# Patient Record
Sex: FEMALE | Race: WHITE | NOT HISPANIC OR LATINO | Employment: STUDENT | URBAN - METROPOLITAN AREA
[De-identification: names, ages, dates, MRNs, and addresses within clinical notes are randomized per-mention and may not be internally consistent; named-entity substitution may affect disease eponyms.]

---

## 2017-03-06 ENCOUNTER — APPOINTMENT (EMERGENCY)
Dept: CT IMAGING | Facility: HOSPITAL | Age: 22
End: 2017-03-06
Payer: COMMERCIAL

## 2017-03-06 ENCOUNTER — HOSPITAL ENCOUNTER (EMERGENCY)
Facility: HOSPITAL | Age: 22
Discharge: HOME | End: 2017-03-06
Attending: EMERGENCY MEDICINE | Admitting: EMERGENCY MEDICINE
Payer: COMMERCIAL

## 2017-03-06 VITALS
HEART RATE: 71 BPM | RESPIRATION RATE: 18 BRPM | DIASTOLIC BLOOD PRESSURE: 63 MMHG | OXYGEN SATURATION: 100 % | SYSTOLIC BLOOD PRESSURE: 98 MMHG | TEMPERATURE: 97.1 F

## 2017-03-06 DIAGNOSIS — N23 RENAL COLIC ON RIGHT SIDE: Primary | ICD-10-CM

## 2017-03-06 DIAGNOSIS — N39.0 UTI (URINARY TRACT INFECTION): ICD-10-CM

## 2017-03-06 LAB
ALBUMIN SERPL BCP-MCNC: 4.3 G/DL (ref 3.5–5)
ALP SERPL-CCNC: 60 U/L (ref 46–116)
ALT SERPL W P-5'-P-CCNC: 19 U/L (ref 12–78)
ANION GAP SERPL CALCULATED.3IONS-SCNC: 8 MMOL/L (ref 4–13)
AST SERPL W P-5'-P-CCNC: 12 U/L (ref 5–45)
BACTERIA UR QL AUTO: ABNORMAL /HPF
BASOPHILS # BLD AUTO: 0.03 THOUSANDS/ΜL (ref 0–0.1)
BASOPHILS NFR BLD AUTO: 0 % (ref 0–1)
BILIRUB SERPL-MCNC: 0.2 MG/DL (ref 0.2–1)
BILIRUB UR QL STRIP: NEGATIVE
BUN SERPL-MCNC: 11 MG/DL (ref 5–25)
CALCIUM SERPL-MCNC: 8.7 MG/DL (ref 8.3–10.1)
CHLORIDE SERPL-SCNC: 106 MMOL/L (ref 100–108)
CK SERPL-CCNC: 80 U/L (ref 26–192)
CLARITY UR: CLEAR
CO2 SERPL-SCNC: 26 MMOL/L (ref 21–32)
COLOR UR: YELLOW
CREAT SERPL-MCNC: 0.68 MG/DL (ref 0.6–1.3)
EOSINOPHIL # BLD AUTO: 0.37 THOUSAND/ΜL (ref 0–0.61)
EOSINOPHIL NFR BLD AUTO: 3 % (ref 0–6)
ERYTHROCYTE [DISTWIDTH] IN BLOOD BY AUTOMATED COUNT: 12.7 % (ref 11.6–15.1)
GFR SERPL CREATININE-BSD FRML MDRD: >60 ML/MIN/1.73SQ M
GLUCOSE SERPL-MCNC: 78 MG/DL (ref 65–140)
GLUCOSE UR STRIP-MCNC: NEGATIVE MG/DL
HCG UR QL: NEGATIVE
HCT VFR BLD AUTO: 37 % (ref 34.8–46.1)
HGB BLD-MCNC: 12.3 G/DL (ref 11.5–15.4)
HGB UR QL STRIP.AUTO: ABNORMAL
KETONES UR STRIP-MCNC: NEGATIVE MG/DL
LEUKOCYTE ESTERASE UR QL STRIP: NEGATIVE
LIPASE SERPL-CCNC: 240 U/L (ref 73–393)
LYMPHOCYTES # BLD AUTO: 4.05 THOUSANDS/ΜL (ref 0.6–4.47)
LYMPHOCYTES NFR BLD AUTO: 37 % (ref 14–44)
MCH RBC QN AUTO: 31.4 PG (ref 26.8–34.3)
MCHC RBC AUTO-ENTMCNC: 33.2 G/DL (ref 31.4–37.4)
MCV RBC AUTO: 94 FL (ref 82–98)
MONOCYTES # BLD AUTO: 0.97 THOUSAND/ΜL (ref 0.17–1.22)
MONOCYTES NFR BLD AUTO: 9 % (ref 4–12)
NEUTROPHILS # BLD AUTO: 5.68 THOUSANDS/ΜL (ref 1.85–7.62)
NEUTS SEG NFR BLD AUTO: 51 % (ref 43–75)
NITRITE UR QL STRIP: NEGATIVE
NON-SQ EPI CELLS URNS QL MICRO: ABNORMAL /HPF
PH UR STRIP.AUTO: 5.5 [PH] (ref 4.5–8)
PLATELET # BLD AUTO: 290 THOUSANDS/UL (ref 149–390)
PMV BLD AUTO: 8.8 FL (ref 8.9–12.7)
POTASSIUM SERPL-SCNC: 3.7 MMOL/L (ref 3.5–5.3)
PROT SERPL-MCNC: 7.1 G/DL (ref 6.4–8.2)
PROT UR STRIP-MCNC: NEGATIVE MG/DL
RBC # BLD AUTO: 3.92 MILLION/UL (ref 3.81–5.12)
RBC #/AREA URNS AUTO: ABNORMAL /HPF
SODIUM SERPL-SCNC: 140 MMOL/L (ref 136–145)
SP GR UR STRIP.AUTO: <=1.005 (ref 1–1.03)
UROBILINOGEN UR QL STRIP.AUTO: 0.2 E.U./DL
WBC # BLD AUTO: 11.1 THOUSAND/UL (ref 4.31–10.16)
WBC #/AREA URNS AUTO: ABNORMAL /HPF

## 2017-03-06 PROCEDURE — 87086 URINE CULTURE/COLONY COUNT: CPT | Performed by: EMERGENCY MEDICINE

## 2017-03-06 PROCEDURE — 36415 COLL VENOUS BLD VENIPUNCTURE: CPT | Performed by: EMERGENCY MEDICINE

## 2017-03-06 PROCEDURE — 83690 ASSAY OF LIPASE: CPT | Performed by: EMERGENCY MEDICINE

## 2017-03-06 PROCEDURE — 99284 EMERGENCY DEPT VISIT MOD MDM: CPT

## 2017-03-06 PROCEDURE — 81025 URINE PREGNANCY TEST: CPT | Performed by: EMERGENCY MEDICINE

## 2017-03-06 PROCEDURE — 96374 THER/PROPH/DIAG INJ IV PUSH: CPT

## 2017-03-06 PROCEDURE — 80053 COMPREHEN METABOLIC PANEL: CPT | Performed by: EMERGENCY MEDICINE

## 2017-03-06 PROCEDURE — 81001 URINALYSIS AUTO W/SCOPE: CPT | Performed by: EMERGENCY MEDICINE

## 2017-03-06 PROCEDURE — 96375 TX/PRO/DX INJ NEW DRUG ADDON: CPT

## 2017-03-06 PROCEDURE — 74176 CT ABD & PELVIS W/O CONTRAST: CPT

## 2017-03-06 PROCEDURE — 85025 COMPLETE CBC W/AUTO DIFF WBC: CPT | Performed by: EMERGENCY MEDICINE

## 2017-03-06 PROCEDURE — 96361 HYDRATE IV INFUSION ADD-ON: CPT

## 2017-03-06 PROCEDURE — 82550 ASSAY OF CK (CPK): CPT | Performed by: EMERGENCY MEDICINE

## 2017-03-06 RX ORDER — ONDANSETRON 2 MG/ML
4 INJECTION INTRAMUSCULAR; INTRAVENOUS ONCE
Status: COMPLETED | OUTPATIENT
Start: 2017-03-06 | End: 2017-03-06

## 2017-03-06 RX ORDER — KETOROLAC TROMETHAMINE 30 MG/ML
30 INJECTION, SOLUTION INTRAMUSCULAR; INTRAVENOUS ONCE
Status: COMPLETED | OUTPATIENT
Start: 2017-03-06 | End: 2017-03-06

## 2017-03-06 RX ORDER — SULFAMETHOXAZOLE AND TRIMETHOPRIM 800; 160 MG/1; MG/1
1 TABLET ORAL ONCE
Status: COMPLETED | OUTPATIENT
Start: 2017-03-06 | End: 2017-03-06

## 2017-03-06 RX ORDER — METOCLOPRAMIDE 10 MG/1
10 TABLET ORAL 4 TIMES DAILY
Qty: 28 TABLET | Refills: 0 | Status: SHIPPED | OUTPATIENT
Start: 2017-03-06 | End: 2017-03-13

## 2017-03-06 RX ORDER — SULFAMETHOXAZOLE AND TRIMETHOPRIM 800; 160 MG/1; MG/1
1 TABLET ORAL 2 TIMES DAILY
Qty: 14 TABLET | Refills: 0 | Status: SHIPPED | OUTPATIENT
Start: 2017-03-06 | End: 2017-03-13

## 2017-03-06 RX ORDER — TAMSULOSIN HYDROCHLORIDE 0.4 MG/1
0.4 CAPSULE ORAL ONCE
Status: COMPLETED | OUTPATIENT
Start: 2017-03-06 | End: 2017-03-06

## 2017-03-06 RX ORDER — OXYCODONE HYDROCHLORIDE AND ACETAMINOPHEN 5; 325 MG/1; MG/1
1 TABLET ORAL EVERY 4 HOURS PRN
Qty: 20 TABLET | Refills: 0 | Status: SHIPPED | OUTPATIENT
Start: 2017-03-06 | End: 2017-03-16

## 2017-03-06 RX ADMIN — HYDROMORPHONE HYDROCHLORIDE 0.5 MG: 1 INJECTION, SOLUTION INTRAMUSCULAR; INTRAVENOUS; SUBCUTANEOUS at 02:39

## 2017-03-06 RX ADMIN — KETOROLAC TROMETHAMINE 30 MG: 30 INJECTION, SOLUTION INTRAMUSCULAR at 03:11

## 2017-03-06 RX ADMIN — ONDANSETRON 4 MG: 2 INJECTION INTRAMUSCULAR; INTRAVENOUS at 02:18

## 2017-03-06 RX ADMIN — TAMSULOSIN HYDROCHLORIDE 0.4 MG: 0.4 CAPSULE ORAL at 03:55

## 2017-03-06 RX ADMIN — SODIUM CHLORIDE 1000 ML: 0.9 INJECTION, SOLUTION INTRAVENOUS at 02:14

## 2017-03-06 RX ADMIN — HYDROMORPHONE HYDROCHLORIDE 0.5 MG: 1 INJECTION, SOLUTION INTRAMUSCULAR; INTRAVENOUS; SUBCUTANEOUS at 02:19

## 2017-03-06 RX ADMIN — SULFAMETHOXAZOLE AND TRIMETHOPRIM 1 TABLET: 800; 160 TABLET ORAL at 03:56

## 2017-03-07 LAB — BACTERIA UR CULT: NORMAL

## 2017-09-27 ENCOUNTER — HOSPITAL ENCOUNTER (OUTPATIENT)
Dept: RADIOLOGY | Facility: HOSPITAL | Age: 22
Discharge: HOME | End: 2017-09-27
Attending: UROLOGY
Payer: COMMERCIAL

## 2017-09-27 ENCOUNTER — TRANSCRIBE ORDERS (OUTPATIENT)
Dept: ADMINISTRATIVE | Facility: HOSPITAL | Age: 22
End: 2017-09-27

## 2017-09-27 ENCOUNTER — HOSPITAL ENCOUNTER (OUTPATIENT)
Dept: ULTRASOUND IMAGING | Facility: HOSPITAL | Age: 22
Discharge: HOME | End: 2017-09-27
Payer: COMMERCIAL

## 2017-09-27 DIAGNOSIS — N20.0 CALCULUS OF KIDNEY: ICD-10-CM

## 2017-09-27 DIAGNOSIS — N20.0 CALCULUS OF KIDNEY: Primary | ICD-10-CM

## 2017-09-27 PROCEDURE — 76770 US EXAM ABDO BACK WALL COMP: CPT

## 2017-09-27 PROCEDURE — 74000 HB X-RAY EXAM OF ABDOMEN (SINGLE ANTEROPOSTERIOR VIEW): CPT

## 2018-01-13 NOTE — MISCELLANEOUS
Message  Called patient to review ultrasound results  I reached her voicemail, and discussed that her 3mm right ureteral calculus had passed  In addition her hydroureteronephrosis has resolved  4mm right lower pole stone remains stable  Debris in bladder likely represents passed stone fragments, as her UA last week was negative for infection  Plan is to continue potassium citrate  She has been scheduled for an office visit later this week as well to review short and long term management of her stone disease        Signatures   Electronically signed by : Raoul Esteban MD; Nov 21 2016  4:09PM EST                       (Author)

## 2018-01-17 NOTE — MISCELLANEOUS
Message  I have attempted to contact the patient multiple times over the past 72 hours in response to her left flank and abdominal pain  I have left her a few voice mail messages  I reviewed her most recent KUB which has revealed some intrarenal left calculi but no evidence of ureteral calculi  Her prior ultrasound was negative for hydronephrosis  I told her by phone that I'm not sure if her symptoms are related to her left intrarenal calculi  Nevertheless I would like to present her with a few options which include obtaining a CAT scan, or proceeding with intervention in the form of ESWL  When we spoke last she preferred to avoid radiation exposure from it additional CAT scan secondary to her young age and I think that this is certainly an appropriate concern  As her stones are radiolucent on KUB and her symptoms are on the left her stones could be targeted by ESWL if she remains symptomatic  She was instructed to call back to my office if she continues to have any symptoms  Again she may benefit from ESWL though it is unclear if her abdominal and left-sided pain is in fact related to the stones were not        Signatures   Electronically signed by : Pawan Fitzpatrick MD; Jul 1 2016  9:31AM EST                       (Author)

## 2018-01-17 NOTE — MISCELLANEOUS
Provider Comments  Provider Comments:   Patient was a no show for today's appointment    Anyi Zavala      Signatures   Electronically signed by : Ata Rivera MD; Dec  9 2016 11:49AM EST

## 2020-05-16 ENCOUNTER — APPOINTMENT (EMERGENCY)
Dept: ULTRASOUND IMAGING | Facility: HOSPITAL | Age: 25
End: 2020-05-16
Payer: COMMERCIAL

## 2020-05-16 ENCOUNTER — HOSPITAL ENCOUNTER (EMERGENCY)
Facility: HOSPITAL | Age: 25
Discharge: HOME | End: 2020-05-16
Attending: EMERGENCY MEDICINE | Admitting: EMERGENCY MEDICINE
Payer: COMMERCIAL

## 2020-05-16 VITALS
HEART RATE: 92 BPM | SYSTOLIC BLOOD PRESSURE: 97 MMHG | WEIGHT: 123.02 LBS | BODY MASS INDEX: 22.5 KG/M2 | DIASTOLIC BLOOD PRESSURE: 64 MMHG | TEMPERATURE: 98.8 F | OXYGEN SATURATION: 97 % | RESPIRATION RATE: 16 BRPM

## 2020-05-16 DIAGNOSIS — N10 ACUTE PYELONEPHRITIS: Primary | ICD-10-CM

## 2020-05-16 DIAGNOSIS — D72.829 LEUKOCYTOSIS: ICD-10-CM

## 2020-05-16 DIAGNOSIS — R11.2 NAUSEA AND VOMITING: ICD-10-CM

## 2020-05-16 LAB
ALBUMIN SERPL BCP-MCNC: 3.7 G/DL (ref 3.5–5)
ALP SERPL-CCNC: 58 U/L (ref 46–116)
ALT SERPL W P-5'-P-CCNC: 19 U/L (ref 12–78)
ANION GAP SERPL CALCULATED.3IONS-SCNC: 13 MMOL/L (ref 4–13)
AST SERPL W P-5'-P-CCNC: 15 U/L (ref 5–45)
BACTERIA UR QL AUTO: ABNORMAL /HPF
BASOPHILS # BLD MANUAL: 0 THOUSAND/UL (ref 0–0.1)
BASOPHILS NFR MAR MANUAL: 0 % (ref 0–1)
BILIRUB SERPL-MCNC: 0.39 MG/DL (ref 0.2–1)
BILIRUB UR QL STRIP: NEGATIVE
BUN SERPL-MCNC: 11 MG/DL (ref 5–25)
CALCIUM SERPL-MCNC: 8.8 MG/DL (ref 8.3–10.1)
CHLORIDE SERPL-SCNC: 99 MMOL/L (ref 100–108)
CLARITY UR: ABNORMAL
CO2 SERPL-SCNC: 22 MMOL/L (ref 21–32)
COLOR UR: YELLOW
CREAT SERPL-MCNC: 0.89 MG/DL (ref 0.6–1.3)
EOSINOPHIL # BLD MANUAL: 0 THOUSAND/UL (ref 0–0.4)
EOSINOPHIL NFR BLD MANUAL: 0 % (ref 0–6)
ERYTHROCYTE [DISTWIDTH] IN BLOOD BY AUTOMATED COUNT: 12.1 % (ref 11.6–15.1)
EXT PREG TEST URINE: NEGATIVE
EXT. CONTROL ED NAV: NORMAL
GFR SERPL CREATININE-BSD FRML MDRD: 91 ML/MIN/1.73SQ M
GLUCOSE SERPL-MCNC: 108 MG/DL (ref 65–140)
GLUCOSE UR STRIP-MCNC: NEGATIVE MG/DL
HCT VFR BLD AUTO: 39.6 % (ref 34.8–46.1)
HGB BLD-MCNC: 13.3 G/DL (ref 11.5–15.4)
HGB UR QL STRIP.AUTO: ABNORMAL
KETONES UR STRIP-MCNC: ABNORMAL MG/DL
LEUKOCYTE ESTERASE UR QL STRIP: ABNORMAL
LYMPHOCYTES # BLD AUTO: 0.52 THOUSAND/UL (ref 0.6–4.47)
LYMPHOCYTES # BLD AUTO: 3 % (ref 14–44)
MCH RBC QN AUTO: 32.4 PG (ref 26.8–34.3)
MCHC RBC AUTO-ENTMCNC: 33.6 G/DL (ref 31.4–37.4)
MCV RBC AUTO: 96 FL (ref 82–98)
MONOCYTES # BLD AUTO: 1.22 THOUSAND/UL (ref 0–1.22)
MONOCYTES NFR BLD: 7 % (ref 4–12)
MUCOUS THREADS UR QL AUTO: ABNORMAL
NEUTROPHILS # BLD MANUAL: 15.63 THOUSAND/UL (ref 1.85–7.62)
NEUTS BAND NFR BLD MANUAL: 3 % (ref 0–8)
NEUTS SEG NFR BLD AUTO: 87 % (ref 43–75)
NITRITE UR QL STRIP: POSITIVE
NON-SQ EPI CELLS URNS QL MICRO: ABNORMAL /HPF
NRBC BLD AUTO-RTO: 0 /100 WBCS
PH UR STRIP.AUTO: 6 [PH]
PLATELET # BLD AUTO: 195 THOUSANDS/UL (ref 149–390)
PLATELET BLD QL SMEAR: ADEQUATE
PMV BLD AUTO: 8.9 FL (ref 8.9–12.7)
POTASSIUM SERPL-SCNC: 3.6 MMOL/L (ref 3.5–5.3)
PROT SERPL-MCNC: 7.7 G/DL (ref 6.4–8.2)
PROT UR STRIP-MCNC: ABNORMAL MG/DL
RBC # BLD AUTO: 4.11 MILLION/UL (ref 3.81–5.12)
RBC #/AREA URNS AUTO: ABNORMAL /HPF
SODIUM SERPL-SCNC: 134 MMOL/L (ref 136–145)
SP GR UR STRIP.AUTO: >=1.03 (ref 1–1.03)
TOTAL CELLS COUNTED SPEC: 100
UROBILINOGEN UR QL STRIP.AUTO: 0.2 E.U./DL
WBC # BLD AUTO: 17.37 THOUSAND/UL (ref 4.31–10.16)
WBC #/AREA URNS AUTO: ABNORMAL /HPF

## 2020-05-16 PROCEDURE — 76770 US EXAM ABDO BACK WALL COMP: CPT

## 2020-05-16 PROCEDURE — 81001 URINALYSIS AUTO W/SCOPE: CPT | Performed by: PHYSICIAN ASSISTANT

## 2020-05-16 PROCEDURE — 81025 URINE PREGNANCY TEST: CPT | Performed by: PHYSICIAN ASSISTANT

## 2020-05-16 PROCEDURE — 87086 URINE CULTURE/COLONY COUNT: CPT | Performed by: PHYSICIAN ASSISTANT

## 2020-05-16 PROCEDURE — 87040 BLOOD CULTURE FOR BACTERIA: CPT | Performed by: PHYSICIAN ASSISTANT

## 2020-05-16 PROCEDURE — 85007 BL SMEAR W/DIFF WBC COUNT: CPT | Performed by: PHYSICIAN ASSISTANT

## 2020-05-16 PROCEDURE — 96375 TX/PRO/DX INJ NEW DRUG ADDON: CPT

## 2020-05-16 PROCEDURE — 87186 SC STD MICRODIL/AGAR DIL: CPT | Performed by: PHYSICIAN ASSISTANT

## 2020-05-16 PROCEDURE — 87077 CULTURE AEROBIC IDENTIFY: CPT | Performed by: PHYSICIAN ASSISTANT

## 2020-05-16 PROCEDURE — 96361 HYDRATE IV INFUSION ADD-ON: CPT

## 2020-05-16 PROCEDURE — 99284 EMERGENCY DEPT VISIT MOD MDM: CPT

## 2020-05-16 PROCEDURE — 96365 THER/PROPH/DIAG IV INF INIT: CPT

## 2020-05-16 PROCEDURE — 99284 EMERGENCY DEPT VISIT MOD MDM: CPT | Performed by: PHYSICIAN ASSISTANT

## 2020-05-16 PROCEDURE — 85027 COMPLETE CBC AUTOMATED: CPT | Performed by: PHYSICIAN ASSISTANT

## 2020-05-16 PROCEDURE — 36415 COLL VENOUS BLD VENIPUNCTURE: CPT | Performed by: PHYSICIAN ASSISTANT

## 2020-05-16 PROCEDURE — 80053 COMPREHEN METABOLIC PANEL: CPT | Performed by: PHYSICIAN ASSISTANT

## 2020-05-16 RX ORDER — ACETAMINOPHEN 325 MG/1
975 TABLET ORAL ONCE
Status: COMPLETED | OUTPATIENT
Start: 2020-05-16 | End: 2020-05-16

## 2020-05-16 RX ORDER — ONDANSETRON 4 MG/1
4 TABLET, ORALLY DISINTEGRATING ORAL EVERY 8 HOURS PRN
Qty: 20 TABLET | Refills: 0 | Status: SHIPPED | OUTPATIENT
Start: 2020-05-16

## 2020-05-16 RX ORDER — OXYCODONE HYDROCHLORIDE AND ACETAMINOPHEN 5; 325 MG/1; MG/1
1 TABLET ORAL ONCE
Status: COMPLETED | OUTPATIENT
Start: 2020-05-16 | End: 2020-05-16

## 2020-05-16 RX ORDER — ONDANSETRON 2 MG/ML
4 INJECTION INTRAMUSCULAR; INTRAVENOUS ONCE
Status: COMPLETED | OUTPATIENT
Start: 2020-05-16 | End: 2020-05-16

## 2020-05-16 RX ORDER — OXYCODONE HYDROCHLORIDE AND ACETAMINOPHEN 5; 325 MG/1; MG/1
1 TABLET ORAL EVERY 4 HOURS PRN
Qty: 10 TABLET | Refills: 0 | Status: SHIPPED | OUTPATIENT
Start: 2020-05-16 | End: 2020-05-19

## 2020-05-16 RX ORDER — KETOROLAC TROMETHAMINE 30 MG/ML
15 INJECTION, SOLUTION INTRAMUSCULAR; INTRAVENOUS ONCE
Status: DISCONTINUED | OUTPATIENT
Start: 2020-05-16 | End: 2020-05-16

## 2020-05-16 RX ORDER — HYDROMORPHONE HCL/PF 1 MG/ML
0.5 SYRINGE (ML) INJECTION ONCE
Status: COMPLETED | OUTPATIENT
Start: 2020-05-16 | End: 2020-05-16

## 2020-05-16 RX ORDER — CEPHALEXIN 500 MG/1
500 CAPSULE ORAL EVERY 6 HOURS SCHEDULED
Qty: 40 CAPSULE | Refills: 0 | Status: SHIPPED | OUTPATIENT
Start: 2020-05-16 | End: 2020-05-26

## 2020-05-16 RX ORDER — ONDANSETRON 4 MG/1
4 TABLET, ORALLY DISINTEGRATING ORAL EVERY 8 HOURS PRN
Qty: 20 TABLET | Refills: 0 | Status: SHIPPED | OUTPATIENT
Start: 2020-05-16 | End: 2020-05-16 | Stop reason: SDUPTHER

## 2020-05-16 RX ORDER — CEPHALEXIN 500 MG/1
500 CAPSULE ORAL EVERY 6 HOURS SCHEDULED
Qty: 40 CAPSULE | Refills: 0 | Status: SHIPPED | OUTPATIENT
Start: 2020-05-16 | End: 2020-05-16 | Stop reason: SDUPTHER

## 2020-05-16 RX ORDER — ONDANSETRON 4 MG/1
4 TABLET, ORALLY DISINTEGRATING ORAL ONCE
Status: COMPLETED | OUTPATIENT
Start: 2020-05-16 | End: 2020-05-16

## 2020-05-16 RX ADMIN — ONDANSETRON 4 MG: 2 INJECTION INTRAMUSCULAR; INTRAVENOUS at 18:34

## 2020-05-16 RX ADMIN — CEFTRIAXONE SODIUM 1000 MG: 10 INJECTION, POWDER, FOR SOLUTION INTRAVENOUS at 19:20

## 2020-05-16 RX ADMIN — HYDROMORPHONE HYDROCHLORIDE 0.5 MG: 1 INJECTION, SOLUTION INTRAMUSCULAR; INTRAVENOUS; SUBCUTANEOUS at 18:35

## 2020-05-16 RX ADMIN — ONDANSETRON 4 MG: 4 TABLET, ORALLY DISINTEGRATING ORAL at 21:06

## 2020-05-16 RX ADMIN — SODIUM CHLORIDE 1000 ML: 0.9 INJECTION, SOLUTION INTRAVENOUS at 18:32

## 2020-05-16 RX ADMIN — ACETAMINOPHEN 975 MG: 325 TABLET, FILM COATED ORAL at 18:23

## 2020-05-16 RX ADMIN — OXYCODONE HYDROCHLORIDE AND ACETAMINOPHEN 1 TABLET: 5; 325 TABLET ORAL at 21:05

## 2020-05-19 LAB — BACTERIA UR CULT: ABNORMAL

## 2020-05-22 LAB
BACTERIA BLD CULT: NORMAL
BACTERIA BLD CULT: NORMAL

## 2020-09-30 ENCOUNTER — HOSPITAL ENCOUNTER (EMERGENCY)
Facility: HOSPITAL | Age: 25
Discharge: HOME | End: 2020-09-30
Attending: EMERGENCY MEDICINE | Admitting: EMERGENCY MEDICINE
Payer: COMMERCIAL

## 2020-09-30 ENCOUNTER — APPOINTMENT (EMERGENCY)
Dept: CT IMAGING | Facility: HOSPITAL | Age: 25
End: 2020-09-30
Payer: COMMERCIAL

## 2020-09-30 VITALS
RESPIRATION RATE: 16 BRPM | TEMPERATURE: 97.6 F | DIASTOLIC BLOOD PRESSURE: 81 MMHG | OXYGEN SATURATION: 98 % | WEIGHT: 143.08 LBS | HEART RATE: 85 BPM | SYSTOLIC BLOOD PRESSURE: 124 MMHG | BODY MASS INDEX: 26.17 KG/M2

## 2020-09-30 DIAGNOSIS — R11.0 NAUSEA: ICD-10-CM

## 2020-09-30 DIAGNOSIS — N39.0 UTI (URINARY TRACT INFECTION): Primary | ICD-10-CM

## 2020-09-30 DIAGNOSIS — R10.9 LEFT FLANK PAIN: ICD-10-CM

## 2020-09-30 LAB
ALBUMIN SERPL BCP-MCNC: 4 G/DL (ref 3.5–5)
ALP SERPL-CCNC: 45 U/L (ref 46–116)
ALT SERPL W P-5'-P-CCNC: 21 U/L (ref 12–78)
ANION GAP SERPL CALCULATED.3IONS-SCNC: 13 MMOL/L (ref 4–13)
APTT PPP: 29 SECONDS (ref 23–37)
AST SERPL W P-5'-P-CCNC: 14 U/L (ref 5–45)
BACTERIA UR QL AUTO: ABNORMAL /HPF
BASOPHILS # BLD AUTO: 0.03 THOUSANDS/ΜL (ref 0–0.1)
BASOPHILS NFR BLD AUTO: 0 % (ref 0–1)
BILIRUB SERPL-MCNC: 0.35 MG/DL (ref 0.2–1)
BILIRUB UR QL STRIP: ABNORMAL
BUN SERPL-MCNC: 11 MG/DL (ref 5–25)
CALCIUM SERPL-MCNC: 8.8 MG/DL (ref 8.3–10.1)
CHLORIDE SERPL-SCNC: 104 MMOL/L (ref 100–108)
CLARITY UR: ABNORMAL
CO2 SERPL-SCNC: 22 MMOL/L (ref 21–32)
COLOR UR: ABNORMAL
CREAT SERPL-MCNC: 0.7 MG/DL (ref 0.6–1.3)
EOSINOPHIL # BLD AUTO: 0.22 THOUSAND/ΜL (ref 0–0.61)
EOSINOPHIL NFR BLD AUTO: 2 % (ref 0–6)
ERYTHROCYTE [DISTWIDTH] IN BLOOD BY AUTOMATED COUNT: 11.9 % (ref 11.6–15.1)
EXT PREG TEST URINE: NEGATIVE
EXT. CONTROL ED NAV: NORMAL
GFR SERPL CREATININE-BSD FRML MDRD: 121 ML/MIN/1.73SQ M
GLUCOSE SERPL-MCNC: 104 MG/DL (ref 65–140)
GLUCOSE UR STRIP-MCNC: ABNORMAL MG/DL
HCT VFR BLD AUTO: 39.8 % (ref 34.8–46.1)
HGB BLD-MCNC: 13.7 G/DL (ref 11.5–15.4)
HGB UR QL STRIP.AUTO: ABNORMAL
IMM GRANULOCYTES # BLD AUTO: 0.03 THOUSAND/UL (ref 0–0.2)
IMM GRANULOCYTES NFR BLD AUTO: 0 % (ref 0–2)
INR PPP: 0.96 (ref 0.84–1.19)
KETONES UR STRIP-MCNC: ABNORMAL MG/DL
LACTATE SERPL-SCNC: 0.7 MMOL/L (ref 0.5–2)
LEUKOCYTE ESTERASE UR QL STRIP: ABNORMAL
LIPASE SERPL-CCNC: 106 U/L (ref 73–393)
LYMPHOCYTES # BLD AUTO: 3.43 THOUSANDS/ΜL (ref 0.6–4.47)
LYMPHOCYTES NFR BLD AUTO: 24 % (ref 14–44)
MCH RBC QN AUTO: 31.8 PG (ref 26.8–34.3)
MCHC RBC AUTO-ENTMCNC: 34.4 G/DL (ref 31.4–37.4)
MCV RBC AUTO: 92 FL (ref 82–98)
MONOCYTES # BLD AUTO: 1.17 THOUSAND/ΜL (ref 0.17–1.22)
MONOCYTES NFR BLD AUTO: 8 % (ref 4–12)
NEUTROPHILS # BLD AUTO: 9.15 THOUSANDS/ΜL (ref 1.85–7.62)
NEUTS SEG NFR BLD AUTO: 66 % (ref 43–75)
NITRITE UR QL STRIP: ABNORMAL
NON-SQ EPI CELLS URNS QL MICRO: ABNORMAL /HPF
NRBC BLD AUTO-RTO: 0 /100 WBCS
PH UR STRIP.AUTO: 6.5 [PH]
PLATELET # BLD AUTO: 284 THOUSANDS/UL (ref 149–390)
PMV BLD AUTO: 8.9 FL (ref 8.9–12.7)
POTASSIUM SERPL-SCNC: 3.6 MMOL/L (ref 3.5–5.3)
PROT SERPL-MCNC: 7.3 G/DL (ref 6.4–8.2)
PROT UR STRIP-MCNC: ABNORMAL MG/DL
PROTHROMBIN TIME: 12.9 SECONDS (ref 11.6–14.5)
RBC # BLD AUTO: 4.31 MILLION/UL (ref 3.81–5.12)
RBC #/AREA URNS AUTO: ABNORMAL /HPF
SODIUM SERPL-SCNC: 139 MMOL/L (ref 136–145)
SP GR UR STRIP.AUTO: 1.02 (ref 1–1.03)
UROBILINOGEN UR QL STRIP.AUTO: ABNORMAL E.U./DL
WBC # BLD AUTO: 14.03 THOUSAND/UL (ref 4.31–10.16)
WBC #/AREA URNS AUTO: ABNORMAL /HPF

## 2020-09-30 PROCEDURE — 81025 URINE PREGNANCY TEST: CPT | Performed by: PHYSICIAN ASSISTANT

## 2020-09-30 PROCEDURE — 85730 THROMBOPLASTIN TIME PARTIAL: CPT | Performed by: PHYSICIAN ASSISTANT

## 2020-09-30 PROCEDURE — 36415 COLL VENOUS BLD VENIPUNCTURE: CPT | Performed by: PHYSICIAN ASSISTANT

## 2020-09-30 PROCEDURE — 81001 URINALYSIS AUTO W/SCOPE: CPT | Performed by: PHYSICIAN ASSISTANT

## 2020-09-30 PROCEDURE — 87086 URINE CULTURE/COLONY COUNT: CPT | Performed by: PHYSICIAN ASSISTANT

## 2020-09-30 PROCEDURE — 99284 EMERGENCY DEPT VISIT MOD MDM: CPT

## 2020-09-30 PROCEDURE — 74177 CT ABD & PELVIS W/CONTRAST: CPT

## 2020-09-30 PROCEDURE — 87186 SC STD MICRODIL/AGAR DIL: CPT | Performed by: PHYSICIAN ASSISTANT

## 2020-09-30 PROCEDURE — G1004 CDSM NDSC: HCPCS

## 2020-09-30 PROCEDURE — 85025 COMPLETE CBC W/AUTO DIFF WBC: CPT | Performed by: PHYSICIAN ASSISTANT

## 2020-09-30 PROCEDURE — 87077 CULTURE AEROBIC IDENTIFY: CPT | Performed by: PHYSICIAN ASSISTANT

## 2020-09-30 PROCEDURE — 83690 ASSAY OF LIPASE: CPT | Performed by: PHYSICIAN ASSISTANT

## 2020-09-30 PROCEDURE — 96365 THER/PROPH/DIAG IV INF INIT: CPT

## 2020-09-30 PROCEDURE — 99285 EMERGENCY DEPT VISIT HI MDM: CPT | Performed by: PHYSICIAN ASSISTANT

## 2020-09-30 PROCEDURE — 80053 COMPREHEN METABOLIC PANEL: CPT | Performed by: PHYSICIAN ASSISTANT

## 2020-09-30 PROCEDURE — 96361 HYDRATE IV INFUSION ADD-ON: CPT

## 2020-09-30 PROCEDURE — 85610 PROTHROMBIN TIME: CPT | Performed by: PHYSICIAN ASSISTANT

## 2020-09-30 PROCEDURE — 96375 TX/PRO/DX INJ NEW DRUG ADDON: CPT

## 2020-09-30 PROCEDURE — 83605 ASSAY OF LACTIC ACID: CPT | Performed by: PHYSICIAN ASSISTANT

## 2020-09-30 RX ORDER — METOCLOPRAMIDE 10 MG/1
10 TABLET ORAL EVERY 6 HOURS
Qty: 30 TABLET | Refills: 0 | Status: SHIPPED | OUTPATIENT
Start: 2020-09-30

## 2020-09-30 RX ORDER — CEPHALEXIN 500 MG/1
500 CAPSULE ORAL EVERY 12 HOURS SCHEDULED
Qty: 20 CAPSULE | Refills: 0 | Status: SHIPPED | OUTPATIENT
Start: 2020-09-30 | End: 2020-10-10

## 2020-09-30 RX ORDER — KETOROLAC TROMETHAMINE 30 MG/ML
15 INJECTION, SOLUTION INTRAMUSCULAR; INTRAVENOUS ONCE
Status: COMPLETED | OUTPATIENT
Start: 2020-09-30 | End: 2020-09-30

## 2020-09-30 RX ORDER — ONDANSETRON 2 MG/ML
4 INJECTION INTRAMUSCULAR; INTRAVENOUS ONCE
Status: COMPLETED | OUTPATIENT
Start: 2020-09-30 | End: 2020-09-30

## 2020-09-30 RX ORDER — METOCLOPRAMIDE HYDROCHLORIDE 5 MG/ML
10 INJECTION INTRAMUSCULAR; INTRAVENOUS ONCE
Status: COMPLETED | OUTPATIENT
Start: 2020-09-30 | End: 2020-09-30

## 2020-09-30 RX ORDER — PHENAZOPYRIDINE HYDROCHLORIDE 200 MG/1
200 TABLET, FILM COATED ORAL 3 TIMES DAILY
Qty: 6 TABLET | Refills: 0 | Status: SHIPPED | OUTPATIENT
Start: 2020-09-30

## 2020-09-30 RX ORDER — IBUPROFEN 400 MG/1
400 TABLET ORAL EVERY 6 HOURS PRN
Qty: 12 TABLET | Refills: 0 | Status: SHIPPED | OUTPATIENT
Start: 2020-09-30 | End: 2020-10-03

## 2020-09-30 RX ORDER — HYDROMORPHONE HCL/PF 1 MG/ML
0.5 SYRINGE (ML) INJECTION ONCE
Status: COMPLETED | OUTPATIENT
Start: 2020-09-30 | End: 2020-09-30

## 2020-09-30 RX ADMIN — KETOROLAC TROMETHAMINE 15 MG: 30 INJECTION, SOLUTION INTRAMUSCULAR at 07:46

## 2020-09-30 RX ADMIN — METOCLOPRAMIDE HYDROCHLORIDE 10 MG: 5 INJECTION INTRAMUSCULAR; INTRAVENOUS at 07:47

## 2020-09-30 RX ADMIN — ONDANSETRON 4 MG: 2 INJECTION INTRAMUSCULAR; INTRAVENOUS at 06:12

## 2020-09-30 RX ADMIN — HYDROMORPHONE HYDROCHLORIDE 0.5 MG: 1 INJECTION, SOLUTION INTRAMUSCULAR; INTRAVENOUS; SUBCUTANEOUS at 06:14

## 2020-09-30 RX ADMIN — CEFTRIAXONE SODIUM 1000 MG: 10 INJECTION, POWDER, FOR SOLUTION INTRAVENOUS at 06:37

## 2020-09-30 RX ADMIN — SODIUM CHLORIDE 1000 ML: 0.9 INJECTION, SOLUTION INTRAVENOUS at 06:11

## 2020-09-30 RX ADMIN — IOHEXOL 100 ML: 350 INJECTION, SOLUTION INTRAVENOUS at 07:05

## 2020-10-02 LAB — BACTERIA UR CULT: ABNORMAL

## 2020-11-21 ENCOUNTER — APPOINTMENT (EMERGENCY)
Dept: CT IMAGING | Facility: HOSPITAL | Age: 25
End: 2020-11-21
Payer: COMMERCIAL

## 2020-11-21 ENCOUNTER — HOSPITAL ENCOUNTER (EMERGENCY)
Facility: HOSPITAL | Age: 25
Discharge: HOME | End: 2020-11-22
Attending: EMERGENCY MEDICINE | Admitting: EMERGENCY MEDICINE
Payer: COMMERCIAL

## 2020-11-21 VITALS
RESPIRATION RATE: 16 BRPM | BODY MASS INDEX: 25.69 KG/M2 | SYSTOLIC BLOOD PRESSURE: 115 MMHG | WEIGHT: 140.43 LBS | OXYGEN SATURATION: 97 % | HEART RATE: 80 BPM | DIASTOLIC BLOOD PRESSURE: 70 MMHG | TEMPERATURE: 98.8 F

## 2020-11-21 DIAGNOSIS — R10.13 EPIGASTRIC ABDOMINAL PAIN: ICD-10-CM

## 2020-11-21 DIAGNOSIS — K29.70 GASTRITIS: Primary | ICD-10-CM

## 2020-11-21 LAB
ALBUMIN SERPL BCP-MCNC: 4.1 G/DL (ref 3.5–5)
ALP SERPL-CCNC: 46 U/L (ref 46–116)
ALT SERPL W P-5'-P-CCNC: 29 U/L (ref 12–78)
ANION GAP SERPL CALCULATED.3IONS-SCNC: 8 MMOL/L (ref 4–13)
AST SERPL W P-5'-P-CCNC: 23 U/L (ref 5–45)
BASOPHILS # BLD AUTO: 0.03 THOUSANDS/ΜL (ref 0–0.1)
BASOPHILS NFR BLD AUTO: 0 % (ref 0–1)
BILIRUB SERPL-MCNC: 0.47 MG/DL (ref 0.2–1)
BUN SERPL-MCNC: 7 MG/DL (ref 5–25)
CALCIUM SERPL-MCNC: 8.8 MG/DL (ref 8.3–10.1)
CHLORIDE SERPL-SCNC: 103 MMOL/L (ref 100–108)
CO2 SERPL-SCNC: 26 MMOL/L (ref 21–32)
CREAT SERPL-MCNC: 0.67 MG/DL (ref 0.6–1.3)
EOSINOPHIL # BLD AUTO: 0.1 THOUSAND/ΜL (ref 0–0.61)
EOSINOPHIL NFR BLD AUTO: 1 % (ref 0–6)
ERYTHROCYTE [DISTWIDTH] IN BLOOD BY AUTOMATED COUNT: 12.6 % (ref 11.6–15.1)
EXT PREG TEST URINE: NEGATIVE
EXT. CONTROL ED NAV: NORMAL
GFR SERPL CREATININE-BSD FRML MDRD: 123 ML/MIN/1.73SQ M
GLUCOSE SERPL-MCNC: 91 MG/DL (ref 65–140)
HCT VFR BLD AUTO: 40.7 % (ref 34.8–46.1)
HGB BLD-MCNC: 13.3 G/DL (ref 11.5–15.4)
IMM GRANULOCYTES # BLD AUTO: 0.03 THOUSAND/UL (ref 0–0.2)
IMM GRANULOCYTES NFR BLD AUTO: 0 % (ref 0–2)
LIPASE SERPL-CCNC: 72 U/L (ref 73–393)
LYMPHOCYTES # BLD AUTO: 2.71 THOUSANDS/ΜL (ref 0.6–4.47)
LYMPHOCYTES NFR BLD AUTO: 29 % (ref 14–44)
MCH RBC QN AUTO: 31.4 PG (ref 26.8–34.3)
MCHC RBC AUTO-ENTMCNC: 32.7 G/DL (ref 31.4–37.4)
MCV RBC AUTO: 96 FL (ref 82–98)
MONOCYTES # BLD AUTO: 0.68 THOUSAND/ΜL (ref 0.17–1.22)
MONOCYTES NFR BLD AUTO: 7 % (ref 4–12)
NEUTROPHILS # BLD AUTO: 5.83 THOUSANDS/ΜL (ref 1.85–7.62)
NEUTS SEG NFR BLD AUTO: 63 % (ref 43–75)
NRBC BLD AUTO-RTO: 0 /100 WBCS
PLATELET # BLD AUTO: 279 THOUSANDS/UL (ref 149–390)
PMV BLD AUTO: 8.9 FL (ref 8.9–12.7)
POTASSIUM SERPL-SCNC: 4.3 MMOL/L (ref 3.5–5.3)
PROT SERPL-MCNC: 7.2 G/DL (ref 6.4–8.2)
RBC # BLD AUTO: 4.23 MILLION/UL (ref 3.81–5.12)
SODIUM SERPL-SCNC: 137 MMOL/L (ref 136–145)
TROPONIN I SERPL-MCNC: <0.02 NG/ML
WBC # BLD AUTO: 9.38 THOUSAND/UL (ref 4.31–10.16)

## 2020-11-21 PROCEDURE — 99284 EMERGENCY DEPT VISIT MOD MDM: CPT | Performed by: EMERGENCY MEDICINE

## 2020-11-21 PROCEDURE — 81025 URINE PREGNANCY TEST: CPT | Performed by: EMERGENCY MEDICINE

## 2020-11-21 PROCEDURE — 85025 COMPLETE CBC W/AUTO DIFF WBC: CPT | Performed by: EMERGENCY MEDICINE

## 2020-11-21 PROCEDURE — 74177 CT ABD & PELVIS W/CONTRAST: CPT

## 2020-11-21 PROCEDURE — 84484 ASSAY OF TROPONIN QUANT: CPT | Performed by: EMERGENCY MEDICINE

## 2020-11-21 PROCEDURE — 96361 HYDRATE IV INFUSION ADD-ON: CPT

## 2020-11-21 PROCEDURE — 99285 EMERGENCY DEPT VISIT HI MDM: CPT

## 2020-11-21 PROCEDURE — 83690 ASSAY OF LIPASE: CPT | Performed by: EMERGENCY MEDICINE

## 2020-11-21 PROCEDURE — 36415 COLL VENOUS BLD VENIPUNCTURE: CPT | Performed by: EMERGENCY MEDICINE

## 2020-11-21 PROCEDURE — 80053 COMPREHEN METABOLIC PANEL: CPT | Performed by: EMERGENCY MEDICINE

## 2020-11-21 PROCEDURE — C9113 INJ PANTOPRAZOLE SODIUM, VIA: HCPCS | Performed by: EMERGENCY MEDICINE

## 2020-11-21 PROCEDURE — G1004 CDSM NDSC: HCPCS

## 2020-11-21 PROCEDURE — 96375 TX/PRO/DX INJ NEW DRUG ADDON: CPT

## 2020-11-21 PROCEDURE — 93005 ELECTROCARDIOGRAM TRACING: CPT

## 2020-11-21 PROCEDURE — 96374 THER/PROPH/DIAG INJ IV PUSH: CPT

## 2020-11-21 RX ORDER — KETOROLAC TROMETHAMINE 30 MG/ML
15 INJECTION, SOLUTION INTRAMUSCULAR; INTRAVENOUS ONCE
Status: COMPLETED | OUTPATIENT
Start: 2020-11-21 | End: 2020-11-21

## 2020-11-21 RX ORDER — FAMOTIDINE 20 MG/1
20 TABLET, FILM COATED ORAL ONCE
Status: COMPLETED | OUTPATIENT
Start: 2020-11-21 | End: 2020-11-21

## 2020-11-21 RX ORDER — SUCRALFATE 1 G/1
1 TABLET ORAL ONCE
Status: COMPLETED | OUTPATIENT
Start: 2020-11-21 | End: 2020-11-21

## 2020-11-21 RX ORDER — PANTOPRAZOLE SODIUM 40 MG/1
40 INJECTION, POWDER, FOR SOLUTION INTRAVENOUS ONCE
Status: COMPLETED | OUTPATIENT
Start: 2020-11-21 | End: 2020-11-21

## 2020-11-21 RX ORDER — HYDROMORPHONE HCL/PF 1 MG/ML
0.5 SYRINGE (ML) INJECTION ONCE
Status: COMPLETED | OUTPATIENT
Start: 2020-11-21 | End: 2020-11-21

## 2020-11-21 RX ORDER — DICYCLOMINE HCL 20 MG
20 TABLET ORAL ONCE
Status: COMPLETED | OUTPATIENT
Start: 2020-11-21 | End: 2020-11-21

## 2020-11-21 RX ORDER — PANTOPRAZOLE SODIUM 20 MG/1
20 TABLET, DELAYED RELEASE ORAL DAILY
COMMUNITY

## 2020-11-21 RX ADMIN — PANTOPRAZOLE SODIUM 40 MG: 40 INJECTION, POWDER, FOR SOLUTION INTRAVENOUS at 23:16

## 2020-11-21 RX ADMIN — DICYCLOMINE HYDROCHLORIDE 20 MG: 20 TABLET ORAL at 22:28

## 2020-11-21 RX ADMIN — SODIUM CHLORIDE 1000 ML: 0.9 INJECTION, SOLUTION INTRAVENOUS at 20:04

## 2020-11-21 RX ADMIN — HYDROMORPHONE HYDROCHLORIDE 0.5 MG: 1 INJECTION, SOLUTION INTRAMUSCULAR; INTRAVENOUS; SUBCUTANEOUS at 23:11

## 2020-11-21 RX ADMIN — IOHEXOL 100 ML: 350 INJECTION, SOLUTION INTRAVENOUS at 21:20

## 2020-11-21 RX ADMIN — KETOROLAC TROMETHAMINE 15 MG: 30 INJECTION, SOLUTION INTRAMUSCULAR at 21:07

## 2020-11-21 RX ADMIN — SUCRALFATE 1 G: 1 TABLET ORAL at 20:17

## 2020-11-21 RX ADMIN — FAMOTIDINE 20 MG: 20 TABLET, FILM COATED ORAL at 20:17

## 2020-11-22 LAB
ATRIAL RATE: 81 BPM
P AXIS: 78 DEGREES
PR INTERVAL: 122 MS
QRS AXIS: 73 DEGREES
QRSD INTERVAL: 100 MS
QT INTERVAL: 370 MS
QTC INTERVAL: 429 MS
T WAVE AXIS: 38 DEGREES
VENTRICULAR RATE: 81 BPM

## 2020-11-22 PROCEDURE — 93010 ELECTROCARDIOGRAM REPORT: CPT | Performed by: INTERNAL MEDICINE

## 2020-11-22 RX ORDER — HYDROCODONE BITARTRATE AND ACETAMINOPHEN 5; 325 MG/1; MG/1
1 TABLET ORAL EVERY 6 HOURS PRN
Qty: 8 TABLET | Refills: 0 | Status: SHIPPED | OUTPATIENT
Start: 2020-11-22

## 2021-01-22 ENCOUNTER — APPOINTMENT (EMERGENCY)
Dept: ULTRASOUND IMAGING | Facility: HOSPITAL | Age: 26
End: 2021-01-22
Payer: COMMERCIAL

## 2021-01-22 ENCOUNTER — HOSPITAL ENCOUNTER (EMERGENCY)
Facility: HOSPITAL | Age: 26
Discharge: HOME | End: 2021-01-22
Attending: EMERGENCY MEDICINE
Payer: COMMERCIAL

## 2021-01-22 VITALS
RESPIRATION RATE: 16 BRPM | DIASTOLIC BLOOD PRESSURE: 88 MMHG | HEART RATE: 86 BPM | WEIGHT: 147 LBS | OXYGEN SATURATION: 99 % | TEMPERATURE: 98.5 F | SYSTOLIC BLOOD PRESSURE: 128 MMHG | BODY MASS INDEX: 27.05 KG/M2 | HEIGHT: 62 IN

## 2021-01-22 DIAGNOSIS — M54.9 MID BACK PAIN ON LEFT SIDE: Primary | ICD-10-CM

## 2021-01-22 LAB
ALBUMIN SERPL BCP-MCNC: 4.2 G/DL (ref 3.5–5)
ALP SERPL-CCNC: 51 U/L (ref 46–116)
ALT SERPL W P-5'-P-CCNC: 27 U/L (ref 12–78)
ANION GAP SERPL CALCULATED.3IONS-SCNC: 12 MMOL/L (ref 4–13)
AST SERPL W P-5'-P-CCNC: 19 U/L (ref 5–45)
BACTERIA UR QL AUTO: NORMAL /HPF
BASOPHILS # BLD AUTO: 0.03 THOUSANDS/ΜL (ref 0–0.1)
BASOPHILS NFR BLD AUTO: 0 % (ref 0–1)
BILIRUB SERPL-MCNC: 0.21 MG/DL (ref 0.2–1)
BILIRUB UR QL STRIP: NEGATIVE
BUN SERPL-MCNC: 11 MG/DL (ref 5–25)
CALCIUM SERPL-MCNC: 9 MG/DL (ref 8.3–10.1)
CHLORIDE SERPL-SCNC: 104 MMOL/L (ref 100–108)
CLARITY UR: CLEAR
CO2 SERPL-SCNC: 21 MMOL/L (ref 21–32)
COLOR UR: YELLOW
CREAT SERPL-MCNC: 0.56 MG/DL (ref 0.6–1.3)
EOSINOPHIL # BLD AUTO: 0.27 THOUSAND/ΜL (ref 0–0.61)
EOSINOPHIL NFR BLD AUTO: 4 % (ref 0–6)
ERYTHROCYTE [DISTWIDTH] IN BLOOD BY AUTOMATED COUNT: 12.1 % (ref 11.6–15.1)
EXT PREG TEST URINE: NEGATIVE
EXT. CONTROL ED NAV: NORMAL
GFR SERPL CREATININE-BSD FRML MDRD: 130 ML/MIN/1.73SQ M
GLUCOSE SERPL-MCNC: 97 MG/DL (ref 65–140)
GLUCOSE UR STRIP-MCNC: NEGATIVE MG/DL
HCT VFR BLD AUTO: 43.1 % (ref 34.8–46.1)
HGB BLD-MCNC: 14.5 G/DL (ref 11.5–15.4)
HGB UR QL STRIP.AUTO: ABNORMAL
IMM GRANULOCYTES # BLD AUTO: 0.03 THOUSAND/UL (ref 0–0.2)
IMM GRANULOCYTES NFR BLD AUTO: 0 % (ref 0–2)
KETONES UR STRIP-MCNC: NEGATIVE MG/DL
LEUKOCYTE ESTERASE UR QL STRIP: NEGATIVE
LIPASE SERPL-CCNC: 98 U/L (ref 73–393)
LYMPHOCYTES # BLD AUTO: 2.68 THOUSANDS/ΜL (ref 0.6–4.47)
LYMPHOCYTES NFR BLD AUTO: 35 % (ref 14–44)
MCH RBC QN AUTO: 32.4 PG (ref 26.8–34.3)
MCHC RBC AUTO-ENTMCNC: 33.6 G/DL (ref 31.4–37.4)
MCV RBC AUTO: 96 FL (ref 82–98)
MONOCYTES # BLD AUTO: 0.81 THOUSAND/ΜL (ref 0.17–1.22)
MONOCYTES NFR BLD AUTO: 10 % (ref 4–12)
NEUTROPHILS # BLD AUTO: 3.95 THOUSANDS/ΜL (ref 1.85–7.62)
NEUTS SEG NFR BLD AUTO: 51 % (ref 43–75)
NITRITE UR QL STRIP: NEGATIVE
NON-SQ EPI CELLS URNS QL MICRO: NORMAL /HPF
NRBC BLD AUTO-RTO: 0 /100 WBCS
PH UR STRIP.AUTO: 7 [PH] (ref 4.5–8)
PLATELET # BLD AUTO: 277 THOUSANDS/UL (ref 149–390)
PMV BLD AUTO: 9.1 FL (ref 8.9–12.7)
POTASSIUM SERPL-SCNC: 4.1 MMOL/L (ref 3.5–5.3)
PROT SERPL-MCNC: 7.7 G/DL (ref 6.4–8.2)
PROT UR STRIP-MCNC: NEGATIVE MG/DL
RBC # BLD AUTO: 4.47 MILLION/UL (ref 3.81–5.12)
RBC #/AREA URNS AUTO: NORMAL /HPF
SODIUM SERPL-SCNC: 137 MMOL/L (ref 136–145)
SP GR UR STRIP.AUTO: 1.01 (ref 1–1.03)
UROBILINOGEN UR QL STRIP.AUTO: 0.2 E.U./DL
WBC # BLD AUTO: 7.77 THOUSAND/UL (ref 4.31–10.16)
WBC #/AREA URNS AUTO: NORMAL /HPF

## 2021-01-22 PROCEDURE — 99284 EMERGENCY DEPT VISIT MOD MDM: CPT

## 2021-01-22 PROCEDURE — 85025 COMPLETE CBC W/AUTO DIFF WBC: CPT | Performed by: EMERGENCY MEDICINE

## 2021-01-22 PROCEDURE — 36415 COLL VENOUS BLD VENIPUNCTURE: CPT

## 2021-01-22 PROCEDURE — 76770 US EXAM ABDO BACK WALL COMP: CPT

## 2021-01-22 PROCEDURE — 81001 URINALYSIS AUTO W/SCOPE: CPT

## 2021-01-22 PROCEDURE — 83690 ASSAY OF LIPASE: CPT | Performed by: EMERGENCY MEDICINE

## 2021-01-22 PROCEDURE — 99284 EMERGENCY DEPT VISIT MOD MDM: CPT | Performed by: EMERGENCY MEDICINE

## 2021-01-22 PROCEDURE — 80053 COMPREHEN METABOLIC PANEL: CPT | Performed by: EMERGENCY MEDICINE

## 2021-01-22 PROCEDURE — 81025 URINE PREGNANCY TEST: CPT | Performed by: EMERGENCY MEDICINE

## 2021-01-22 RX ORDER — IBUPROFEN 400 MG/1
400 TABLET ORAL ONCE
Status: COMPLETED | OUTPATIENT
Start: 2021-01-22 | End: 2021-01-22

## 2021-01-22 RX ORDER — OXYCODONE HYDROCHLORIDE 5 MG/1
5 TABLET ORAL ONCE
Status: COMPLETED | OUTPATIENT
Start: 2021-01-22 | End: 2021-01-22

## 2021-01-22 RX ORDER — ONDANSETRON 4 MG/1
8 TABLET, ORALLY DISINTEGRATING ORAL ONCE
Status: COMPLETED | OUTPATIENT
Start: 2021-01-22 | End: 2021-01-22

## 2021-01-22 RX ORDER — MECLIZINE HYDROCHLORIDE 25 MG/1
25 TABLET ORAL 3 TIMES DAILY PRN
COMMUNITY

## 2021-01-22 RX ORDER — OXYCODONE HYDROCHLORIDE 5 MG/1
5 CAPSULE ORAL EVERY 6 HOURS PRN
Qty: 3 CAPSULE | Refills: 0 | Status: SHIPPED | OUTPATIENT
Start: 2021-01-22

## 2021-01-22 RX ADMIN — ONDANSETRON 8 MG: 4 TABLET, ORALLY DISINTEGRATING ORAL at 16:21

## 2021-01-22 RX ADMIN — OXYCODONE HYDROCHLORIDE 5 MG: 5 TABLET ORAL at 16:21

## 2021-01-22 RX ADMIN — IBUPROFEN 400 MG: 400 TABLET ORAL at 16:21

## 2021-01-22 NOTE — DISCHARGE INSTRUCTIONS
DIAGNOSIS; LEFT MID BACK     - ACTIVITY AS TOLERATED       - FOR PAIN- CAN TRY BOTH OVER THE COUNTER GENERIC TYLENOL 500 MG - TOGETHER WITH OVER THE COUNTER GENERIC IBUPROFEN 400 MG - 4 TIMES PER DAY - WITH MEALS/ LIQUIDS-- CAN ALSO TRY OVER THE COUNTER LIDOCAINE PATCHES TO AREA     - ONLY AS NEEDED FOR SEVERE PAIN -- OXYCODONE 1 TABLET AS NEEDED       - IF PAIN CONTINUES TO PERSIST- PLEASE CALL YOUR PRIMARY DOCTOR ON Monday TO SCHEDULE AN APPOINTMENT FOR A RECHECK NEXT WEEK     - PLEASE RETURN TO THE ER FOR ANY FEVERS- TEMP > 100 4/ ANY WORSENING/ INTRACTABLE PAIN // ANY PERSISTENT VOMITING OR ANY NEW/ WORSENING/CONCERNING SYMPTOMS TO YOU

## 2021-01-22 NOTE — Clinical Note
Cassandra Pemberton was seen and treated in our emergency department on 1/22/2021  Diagnosis:     Benigno Huggins  may return to work on return date  She may return on this date: 01/25/2021         If you have any questions or concerns, please don't hesitate to call        Manasa Nair MD    ______________________________           _______________          _______________  Hospital Representative                              Date                                Time

## 2021-01-25 NOTE — ED PROVIDER NOTES
History  Chief Complaint   Patient presents with    Flank Pain     Pt complains of left flank pain that started this morning  Pt has Hx of kidneys stones  22 yr female with hx of kidney stone  C/o acute onset this am of left mid back/ upper flank pain with nausea-- no fever/ no vomitus- no change in stools - no gu/gyn comps- no injury       History provided by:  Patient   used: No    Flank Pain  Associated symptoms: nausea    Associated symptoms: no constipation, no diarrhea, no dysuria, no hematuria, no vaginal bleeding, no vaginal discharge and no vomiting        Prior to Admission Medications   Prescriptions Last Dose Informant Patient Reported? Taking?    HYDROcodone-acetaminophen (NORCO) 5-325 mg per tablet Not Taking at Unknown time  No No   Sig: Take 1 tablet by mouth every 6 (six) hours as needed for painMax Daily Amount: 4 tablets   Patient not taking: Reported on 1/22/2021   ibuprofen (MOTRIN) 400 mg tablet   No No   Sig: Take 1 tablet (400 mg total) by mouth every 6 (six) hours as needed for mild pain for up to 3 days   meclizine (ANTIVERT) 25 mg tablet   Yes Yes   Sig: Take 25 mg by mouth 3 (three) times a day as needed for dizziness   metoclopramide (REGLAN) 10 mg tablet Not Taking at Unknown time  No No   Sig: Take 1 tablet (10 mg total) by mouth every 6 (six) hours   Patient not taking: Reported on 11/21/2020   ondansetron (ZOFRAN-ODT) 4 mg disintegrating tablet Not Taking at Unknown time  No No   Sig: Take 1 tablet (4 mg total) by mouth every 8 (eight) hours as needed for nausea or vomiting   Patient not taking: Reported on 11/21/2020   pantoprazole (PROTONIX) 20 mg tablet Not Taking at Unknown time  Yes No   Sig: Take 20 mg by mouth daily   phenazopyridine (PYRIDIUM) 200 mg tablet Not Taking at Unknown time  No No   Sig: Take 1 tablet (200 mg total) by mouth 3 (three) times a day   Patient not taking: Reported on 11/21/2020      Facility-Administered Medications: None Past Medical History:   Diagnosis Date    Kidney stones     started w/ kidney stones 1 5 years ago approx  Past Surgical History:   Procedure Laterality Date    MO FRAGMENT KIDNEY STONE/ ESWL Left 10/11/2016    Procedure: ESWL ;  Surgeon: Jacques Medina MD;  [facility];  Service: Urology    WISDOM TOOTH EXTRACTION         Family History   Problem Relation Age of Onset    Nephrolithiasis Mother      I have reviewed and agree with the history as documented  E-Cigarette/Vaping    E-Cigarette Use Never User      E-Cigarette/Vaping Substances     Social History     Tobacco Use    Smoking status: Former Smoker    Smokeless tobacco: Never Used   Substance Use Topics    Alcohol use: Not Currently     Comment: socially    Drug use: No       Review of Systems   Constitutional: Negative  HENT: Negative  Eyes: Negative  Respiratory: Negative  Cardiovascular: Negative  Gastrointestinal: Positive for nausea  Negative for abdominal distention, abdominal pain, anal bleeding, blood in stool, constipation, diarrhea, rectal pain and vomiting  Endocrine: Negative  Genitourinary: Positive for flank pain  Negative for decreased urine volume, difficulty urinating, dyspareunia, dysuria, enuresis, frequency, genital sores, hematuria, menstrual problem, pelvic pain, urgency, vaginal bleeding, vaginal discharge and vaginal pain  Musculoskeletal: Positive for back pain  Negative for arthralgias, gait problem, joint swelling, myalgias, neck pain and neck stiffness  Skin: Negative  Allergic/Immunologic: Negative  Neurological: Negative  Hematological: Negative  Psychiatric/Behavioral: Negative  Physical Exam  Physical Exam  Vitals signs and nursing note reviewed  Constitutional:       General: She is not in acute distress  Appearance: Normal appearance  She is not ill-appearing, toxic-appearing or diaphoretic        Comments: avss-- pulse ox 99 % on ra- interpretation is normal- no intervention-    HENT:      Head: Normocephalic and atraumatic  Nose: Nose normal       Mouth/Throat:      Mouth: Mucous membranes are moist    Eyes:      General: No scleral icterus  Right eye: No discharge  Left eye: No discharge  Extraocular Movements: Extraocular movements intact  Conjunctiva/sclera: Conjunctivae normal       Pupils: Pupils are equal, round, and reactive to light  Comments: Mm pink   Neck:      Musculoskeletal: Normal range of motion and neck supple  No neck rigidity or muscular tenderness  Vascular: No carotid bruit  Comments: No pmt c/t/l/s spine   Cardiovascular:      Rate and Rhythm: Normal rate and regular rhythm  Pulses: Normal pulses  Heart sounds: Normal heart sounds  No murmur  No friction rub  No gallop  Pulmonary:      Effort: Pulmonary effort is normal  No respiratory distress  Breath sounds: Normal breath sounds  No stridor  No wheezing, rhonchi or rales  Chest:      Chest wall: No tenderness  Abdominal:      General: Abdomen is flat  Bowel sounds are normal  There is no distension  Palpations: Abdomen is soft  There is no mass  Tenderness: There is no abdominal tenderness  There is left CVA tenderness  There is no right CVA tenderness, guarding or rebound  Hernia: No hernia is present  Comments: Soft nt/nd- no peritoneal signs   Musculoskeletal: Normal range of motion  General: No swelling, tenderness, deformity or signs of injury  Right lower leg: No edema  Left lower leg: No edema  Comments: Equal bilateral radial/dp pulses- no ble edema/calf tenderness/asym/ erythema   Lymphadenopathy:      Cervical: No cervical adenopathy  Skin:     General: Skin is warm  Capillary Refill: Capillary refill takes less than 2 seconds  Coloration: Skin is not jaundiced or pale  Findings: No bruising, erythema, lesion or rash     Neurological: General: No focal deficit present  Mental Status: She is alert and oriented to person, place, and time  Mental status is at baseline  Cranial Nerves: No cranial nerve deficit  Sensory: No sensory deficit  Motor: No weakness        Coordination: Coordination normal       Gait: Gait normal       Comments: Normal non focal neuro exam    Psychiatric:         Mood and Affect: Mood normal          Behavior: Behavior normal          Vital Signs  ED Triage Vitals [01/22/21 1510]   Temperature Pulse Respirations Blood Pressure SpO2   98 5 °F (36 9 °C) 86 16 128/88 99 %      Temp Source Heart Rate Source Patient Position - Orthostatic VS BP Location FiO2 (%)   Oral Monitor Sitting Left arm --      Pain Score       7           Vitals:    01/22/21 1510   BP: 128/88   Pulse: 86   Patient Position - Orthostatic VS: Sitting         Visual Acuity      ED Medications  Medications   ibuprofen (MOTRIN) tablet 400 mg (400 mg Oral Given 1/22/21 1621)   oxyCODONE (ROXICODONE) IR tablet 5 mg (5 mg Oral Given 1/22/21 1621)   ondansetron (ZOFRAN-ODT) dispersible tablet 8 mg (8 mg Oral Given 1/22/21 1621)       Diagnostic Studies  Results Reviewed     Procedure Component Value Units Date/Time    Urine Microscopic [[id number]]  (Normal) Collected: 01/22/21 1552    Lab Status: Final result Specimen: Urine, Clean Catch Updated: 01/22/21 1757     RBC, UA None Seen /hpf      WBC, UA None Seen /hpf      Epithelial Cells Occasional /hpf      Bacteria, UA None Seen /hpf     Comprehensive metabolic panel [[id number]]  (Abnormal) Collected: 01/22/21 1519    Lab Status: Final result Specimen: Blood from Arm, Left Updated: 01/22/21 1603     Sodium 137 mmol/L      Potassium 4 1 mmol/L      Chloride 104 mmol/L      CO2 21 mmol/L      ANION GAP 12 mmol/L      BUN 11 mg/dL      Creatinine 0 56 mg/dL      Glucose 97 mg/dL      Calcium 9 0 mg/dL      AST 19 U/L      ALT 27 U/L      Alkaline Phosphatase 51 U/L      Total Protein 7 7 g/dL Albumin 4 2 g/dL      Total Bilirubin 0 21 mg/dL      eGFR 130 ml/min/1 73sq m     Narrative:      [place] guidelines for Chronic Kidney Disease (CKD):     Stage 1 with normal or high GFR (GFR > 90 mL/min/1 73 square meters)    Stage 2 Mild CKD (GFR = 60-89 mL/min/1 73 square meters)    Stage 3A Moderate CKD (GFR = 45-59 mL/min/1 73 square meters)    Stage 3B Moderate CKD (GFR = 30-44 mL/min/1 73 square meters)    Stage 4 Severe CKD (GFR = 15-29 mL/min/1 73 square meters)    Stage 5 End Stage CKD (GFR <15 mL/min/1 73 square meters)  Note: GFR calculation is accurate only with a steady state creatinine    Lipase [[id number]]  (Normal) Collected: 01/22/21 1519    Lab Status: Final result Specimen: Blood from Arm, Left Updated: 01/22/21 1603     Lipase 98 u/L     POCT pregnancy, urine [[id number]]  (Normal) Resulted: 01/22/21 1554    Lab Status: Final result Specimen: Urine Updated: 01/22/21 1554     EXT PREG TEST UR (Ref: Negative) negative     Control valid    Urine Macroscopic, POC [[id number]]  (Abnormal) Collected: 01/22/21 1552    Lab Status: Final result Specimen: Urine Updated: 01/22/21 1554     Color, UA Yellow     Clarity, UA Clear     pH, UA 7 0     Leukocytes, UA Negative     Nitrite, UA Negative     Protein, UA Negative mg/dl      Glucose, UA Negative mg/dl      Ketones, UA Negative mg/dl      Urobilinogen, UA 0 2 E U /dl      Bilirubin, UA Negative     Blood, UA Trace     Specific [place], UA 1 010    Narrative:      CLINITEK RESULT    CBC and differential [[id number]] Collected: 01/22/21 1519    Lab Status: Final result Specimen: Blood from Arm, Left Updated: 01/22/21 1526     WBC 7 77 Thousand/uL      RBC 4 47 Million/uL      Hemoglobin 14 5 g/dL      Hematocrit 43 1 %      MCV 96 fL      MCH 32 4 pg      MCHC 33 6 g/dL      RDW 12 1 %      MPV 9 1 fL      Platelets 344 Thousands/uL      nRBC 0 /100 WBCs      Neutrophils Relative 51 %      Immat GRANS % 0 %      Lymphocytes Relative 35 %      Monocytes Relative 10 %      Eosinophils Relative 4 %      Basophils Relative 0 %      Neutrophils Absolute 3 95 Thousands/µL      Immature Grans Absolute 0 03 Thousand/uL      Lymphocytes Absolute 2 68 Thousands/µL      Monocytes Absolute 0 81 Thousand/µL      Eosinophils Absolute 0 27 Thousand/µL      Basophils Absolute 0 03 Thousands/µL                  US kidney and bladder   Final Result by Mike Gomez MD (01/22 1658)      Normal        Workstation performed: [id number]                    Procedures  Procedures         ED Course  ED Course as of Jan 25 0934 Fri Jan 22, 2021   1603 - ER MD NOTE- INITIAL WORKUP FIRST NURSED -- PT OFFERED IV ANALGESIA- WOULD PREFER PO'S AT THIS POINT=- WILL ORDER      1714 ER MD NOTE- U/S FINDINGS DISCUSSED WITH PT       1714 ER MD MEDICAL DECISION MAKING NOTE- PT IS LOW RISK FOR PE BY WELL S SCORE- SCORE OF 0- PERC-NEG                                              MDM    Disposition  Final diagnoses:   Mid back pain on left side     Time reflects when diagnosis was documented in both MDM as applicable and the Disposition within this note     Time User Action Codes Description Comment    1/22/2021  5:15 PM Audrey Alpers Add [M54 9] Mid back pain on left side       ED Disposition     ED Disposition Condition Date/Time Comment    Discharge Stable Fri Jan 22, 2021  5:15 PM Bro Dorado discharge to home/self care              Follow-up Information    None         Discharge Medication List as of 1/22/2021  5:20 PM      START taking these medications    Details   oxyCODONE (OXY-IR) 5 MG capsule Take 1 capsule (5 mg total) by mouth every 6 (six) hours as needed for severe pain for up to 3 doses SUBSTITUTE OXYCODONE TABLETS FOR CAPSULESMax Daily Amount: 20 mg, Starting Fri 1/22/2021, Normal         CONTINUE these medications which have NOT CHANGED    Details   meclizine (ANTIVERT) 25 mg tablet Take 25 mg by mouth 3 (three) times a day as needed for dizziness, Historical Med      HYDROcodone-acetaminophen (NORCO) 5-325 mg per tablet Take 1 tablet by mouth every 6 (six) hours as needed for painMax Daily Amount: 4 tablets, Starting Sun 11/22/2020, Normal      ibuprofen (MOTRIN) 400 mg tablet Take 1 tablet (400 mg total) by mouth every 6 (six) hours as needed for mild pain for up to 3 days, Starting Wed 9/30/2020, Until Sat 10/3/2020, Normal      metoclopramide (REGLAN) 10 mg tablet Take 1 tablet (10 mg total) by mouth every 6 (six) hours, Starting Wed 9/30/2020, Normal      ondansetron (ZOFRAN-ODT) 4 mg disintegrating tablet Take 1 tablet (4 mg total) by mouth every 8 (eight) hours as needed for nausea or vomiting, Starting Sat 5/16/2020, Normal      pantoprazole (PROTONIX) 20 mg tablet Take 20 mg by mouth daily, Historical Med      phenazopyridine (PYRIDIUM) 200 mg tablet Take 1 tablet (200 mg total) by mouth 3 (three) times a day, Starting Wed 9/30/2020, Normal           No discharge procedures on file      PDMP Review     None          ED Provider  Electronically Signed by           Krystal Segura MD  01/25/21 3254

## 2023-03-02 ENCOUNTER — APPOINTMENT (OUTPATIENT)
Dept: RADIOLOGY | Age: 28
End: 2023-03-02

## 2023-03-02 ENCOUNTER — TELEPHONE (OUTPATIENT)
Dept: OBGYN CLINIC | Facility: HOSPITAL | Age: 28
End: 2023-03-02

## 2023-03-02 ENCOUNTER — OFFICE VISIT (OUTPATIENT)
Dept: URGENT CARE | Age: 28
End: 2023-03-02

## 2023-03-02 VITALS
RESPIRATION RATE: 16 BRPM | DIASTOLIC BLOOD PRESSURE: 84 MMHG | OXYGEN SATURATION: 99 % | TEMPERATURE: 97.9 F | SYSTOLIC BLOOD PRESSURE: 122 MMHG | HEART RATE: 88 BPM

## 2023-03-02 DIAGNOSIS — S69.92XA INJURY OF FINGER OF LEFT HAND, INITIAL ENCOUNTER: ICD-10-CM

## 2023-03-02 DIAGNOSIS — S62.619A: Primary | ICD-10-CM

## 2023-03-02 NOTE — PATIENT INSTRUCTIONS
Please keep splint intact has applied in office until you are seen and cleared by the orthopedic specialist     Please apply ice to affected area and alternate ibuprofen and Tylenol for pain

## 2023-03-02 NOTE — LETTER
March 2, 2023     Patient: Mika Joseph   YOB: 1995   Date of Visit: 3/2/2023       To Whom It May Concern: It is my medical opinion that Ema Dsouza may return to light duty immediately with the following restrictions: no use of left hand until she is seen and cleared by orthopedics  If you have any questions or concerns, please don't hesitate to call           Sincerely,        SHENG Andino    CC: No Recipients

## 2023-03-02 NOTE — PROGRESS NOTES
[facility]        NAME: Berkley Ritter is a 32 y o  female  : 1995    MRN: [id number]  DATE: 2023  TIME: 6:41 PM    Assessment and Plan   Displaced fracture of proximal phalanx of finger of left hand [S60 209N]  1  Displaced fracture of proximal phalanx of finger of left hand  XR hand 3+ vw left    Ambulatory Referral to Orthopedic Surgery        X-ray of left hand reviewed, displaced fracture to left proximal phalanx  Referral placed to orthopedics  Splint application    Date/Time: 3/2/2023 6:41 PM  Performed by: SHENG Maldonado  Authorized by: SHENG Maldonado   Universal Protocol:  Consent: Verbal consent obtained  Risks and benefits: risks, benefits and alternatives were discussed  Consent given by: patient  Time out: Immediately prior to procedure a "time out" was called to verify the correct patient, procedure, equipment, support staff and site/side marked as required  Patient understanding: patient states understanding of the procedure being performed  Patient consent: the patient's understanding of the procedure matches consent given  Patient identity confirmed: verbally with patient      Pre-procedure details:     Sensation:  Normal    Skin color:  Ecchymotic, appropriate for ethnicity  Procedure details:     Laterality:  Left    Location:  Hand    Hand:  L hand    Splint type:  Ulnar gutter    Supplies:  Cotton padding, elastic bandage and Ortho-Glass  Post-procedure details:     Pain:  Unchanged    Sensation:  Normal    Skin color:  Appropriate for ethnicity    Patient tolerance of procedure: Tolerated well, no immediate complications      Patient Instructions     Please keep splint intact has applied in office until you are seen and cleared by the orthopedic specialist     Please apply ice to affected area and alternate ibuprofen and Tylenol for pain  Follow up with PCP in 3-5 days  Proceed to  ER if symptoms worsen      Chief Complaint     Chief Complaint   Patient presents with   • Hand Pain     Left pinky finger injured when dog pulled on leash yesterday, swollen and bruised, limited ROM, full sensation,          History of Present Illness       Patient presenting for evaluation of left hand injury  Patient states that last night she was taking her dog out, when the leash wrapped around her hand, and she hit her hand on the door  She states that after the injury she had swelling, pain and bruising  She states since the injury she has been applying ice to the area and taking ibuprofen with minimal relief  She denies any numbness or tingling or radiation of the pain  Denies any previous left hand injuries  Review of Systems   Review of Systems   Constitutional: Negative for chills and fever  Musculoskeletal: Positive for arthralgias and joint swelling  Skin: Positive for color change (Bruising)  All other systems reviewed and are negative          Current Medications       Current Outpatient Medications:   •  HYDROcodone-acetaminophen (NORCO) 5-325 mg per tablet, Take 1 tablet by mouth every 6 (six) hours as needed for painMax Daily Amount: 4 tablets (Patient not taking: Reported on 1/22/2021), Disp: 8 tablet, Rfl: 0  •  ibuprofen (MOTRIN) 400 mg tablet, Take 1 tablet (400 mg total) by mouth every 6 (six) hours as needed for mild pain for up to 3 days, Disp: 12 tablet, Rfl: 0  •  meclizine (ANTIVERT) 25 mg tablet, Take 25 mg by mouth 3 (three) times a day as needed for dizziness, Disp: , Rfl:   •  metoclopramide (REGLAN) 10 mg tablet, Take 1 tablet (10 mg total) by mouth every 6 (six) hours (Patient not taking: Reported on 11/21/2020), Disp: 30 tablet, Rfl: 0  •  ondansetron (ZOFRAN-ODT) 4 mg disintegrating tablet, Take 1 tablet (4 mg total) by mouth every 8 (eight) hours as needed for nausea or vomiting (Patient not taking: Reported on 11/21/2020), Disp: 20 tablet, Rfl: 0  •  oxyCODONE (OXY-IR) 5 MG capsule, Take 1 capsule (5 mg total) by mouth every 6 (six) hours as needed for severe pain for up to 3 doses SUBSTITUTE OXYCODONE TABLETS FOR CAPSULESMax Daily Amount: 20 mg, Disp: 3 capsule, Rfl: 0  •  pantoprazole (PROTONIX) 20 mg tablet, Take 20 mg by mouth daily, Disp: , Rfl:   •  phenazopyridine (PYRIDIUM) 200 mg tablet, Take 1 tablet (200 mg total) by mouth 3 (three) times a day (Patient not taking: Reported on 11/21/2020), Disp: 6 tablet, Rfl: 0    Current Allergies     Allergies as of 03/02/2023   • (No Known Allergies)            The following portions of the patient's history were reviewed and updated as appropriate: allergies, current medications, past family history, past medical history, past social history, past surgical history and problem list      Past Medical History:   Diagnosis Date   • Kidney stones     started w/ kidney stones 1 5 years ago approx  Past Surgical History:   Procedure Laterality Date   • IN LITHOTRIPSY XTRCORP SHOCK WAVE Left 10/11/2016    Procedure: ESWL ;  Surgeon: Xenia Roman MD;  Location[facility];  Service: Urology   • WISDOM TOOTH EXTRACTION         Family History   Problem Relation Age of Onset   • Nephrolithiasis Mother          Medications have been verified  Objective   /84   Pulse 88   Temp 97 9 °F (36 6 °C)   Resp 16   SpO2 99%        Physical Exam     Physical Exam  Vitals and nursing note reviewed  Constitutional:       General: She is not in acute distress  Appearance: Normal appearance  She is normal weight  She is not ill-appearing, toxic-appearing or diaphoretic  HENT:      Head: Normocephalic and atraumatic  Cardiovascular:      Rate and Rhythm: Normal rate  Pulses: Normal pulses  Heart sounds: No murmur heard  Pulmonary:      Effort: Pulmonary effort is normal    Musculoskeletal:         General: Swelling, tenderness and signs of injury present        Comments: Tenderness to palpation over left fifth finger, range of motion limited due to pain and swelling, normal pulse, capillary fill and sensation appreciated, generalized ecchymosis and swelling   Skin:     General: Skin is warm and dry  Capillary Refill: Capillary refill takes less than 2 seconds  Findings: Bruising present  Neurological:      Mental Status: She is alert     Psychiatric:         Mood and Affect: Mood normal          Behavior: Behavior normal

## 2023-03-03 NOTE — TELEPHONE ENCOUNTER
Patient is being referred to a orthopedics. Please schedule accordingly.     [facility]   [phone]

## 2023-03-03 NOTE — TELEPHONE ENCOUNTER
Caller: Patient     Doctor: n/a     Reason for call: Patient returning call to schedule .      Placed on hold to contact hand scheduling team and patient disconnected call     Call back#: n/a

## 2023-03-06 ENCOUNTER — PREP FOR PROCEDURE (OUTPATIENT)
Dept: OBGYN CLINIC | Facility: CLINIC | Age: 28
End: 2023-03-06

## 2023-03-06 ENCOUNTER — OFFICE VISIT (OUTPATIENT)
Dept: OBGYN CLINIC | Facility: CLINIC | Age: 28
End: 2023-03-06

## 2023-03-06 DIAGNOSIS — S62.617A CLOSED DISPLACED FRACTURE OF PROXIMAL PHALANX OF LEFT LITTLE FINGER, INITIAL ENCOUNTER: Primary | ICD-10-CM

## 2023-03-06 NOTE — PROGRESS NOTES
ORTHOPAEDIC HAND, WRIST, AND ELBOW OFFICE  VISIT       ASSESSMENT/PLAN:      Diagnoses and all orders for this visit:    Closed displaced fracture of proximal phalanx of left little finger, initial encounter  -     Splint application  -     Case request operating room: CLOSED REDUCTION PERCUTANEOUS PINNING VERSUS OPEN REDUCTION W/ INTERNAL FIXATION (ORIF) - LEFT SMALL  FINGER PROXIMAL PHALANX; Standing  -     Case request operating room: CLOSED REDUCTION PERCUTANEOUS PINNING VERSUS OPEN REDUCTION W/ INTERNAL FIXATION (ORIF) - LEFT SMALL  FINGER PROXIMAL PHALANX    Other orders  -     Diet NPO; Sips with meds; Standing  -     Void on call to OR; Standing  -     Insert peripheral IV; Standing  -     ceFAZolin (ANCEF) 2,000 mg in dextrose 5 % 100 mL IVPB      27-year-old female with left small finger proximal phalanx fracture, DOI 3/1/23    X-rays were reviewed in the office today  I discussed with the patient that there is likely malrotation on exam (clinical photograph taken in Media) and further supported by displacement noted on x-rays however, this is difficult to fully assess as the patient is unable to make a full fist on exam today  Surgical versus non operative intervention was discussed  The patient elected to proceed with surgical intervention in the form of left small finger proximal phalanx CRPP versus ORIF  Risks and benefits were discussed at length  Risks of the surgery are inclusive of but not limited to bleeding, infection, nerve injury, blood clot, worsening of symptoms, not achieving the anticipated results, persistent stiffness, weakness and the need for additional surgery  Discussed in particular risk of malrotation and stiffness postoperatively  The patient verbally stated they understood those risks and would like to proceed with the surgery  The patient was placed in a ulnar gutter splint in the office today  The patient verbalized understanding of exam findings and treatment plan   We engaged in the shared decision-making process and treatment options were discussed at length with the patient  Surgical and conservative management discussed today along with risks and benefits  Follow Up: After surgery       To Do Next Visit:  X-rays of the  left  small finger and Cast/splint off prior to x-ray      Operative Discussions:  Fracture Operative Treatment: The physiology of a fractured bone was discussed with the patient today  With displaced fractures, operative treatment often results in a functional recovery  Typically, these fractures undergo reduction either through percutaneous or open methods depending on the location and fracture pattern  Radiographs are typically taken at intervals throughout the fracture healing ensure maintenance of reduction and alignment  If the fracture loses its alignment, revision surgery may be required  Medical conditions such as diabetes, osteoporosis, vitamin D deficiency, and a history of or exposure to smoking may delay or prevent fracture healing  The risks and benefits of the procedure were explained to the patient, which include, but are not limited to: Bleeding, infection, recurrence, pain, scar, malunion, nonunion, damage to tendons, damage to nerves, and damage to blood vessels, and complications related to anesthesia, failure to give desire result, need for more surgery  These risks, along with alternative conservative treatment options, and postoperative protocols were voiced back and understood by the patient  All questions were answered to the patient's satisfaction  The patient agrees to comply with a standard postoperative protocol, and is willing to proceed  Education was provided via written and auditory forms  There were no barriers to learning  Written handouts regarding wound care, incision and scar care, and general preoperative information was provided to the patient    Prior to surgery, the patient may be requested to stop all anti-inflammatory medications  Prophylactic aspirin, Plavix, and Coumadin may be allowed to be continued  Medications including vitamin E , ginkgo, and fish oil are requested to be stopped approximately one week prior to surgery  Hypertensive medications and beta blockers, if taken, should be continued  Skyler Mccullough MD  Attending, Orthopaedic Surgery  Hand, Wrist, and Elbow Surgery  [address]  [facility]    ____________________________________________________________________________________________________________________________________________      CHIEF COMPLAINT:  Chief Complaint   Patient presents with   • Left Hand - Pain       SUBJECTIVE:  Parker Segundo is a 32y o  year old LHD female who presents today for evaluation and treatment of left small finger pain  The patient is referred by SHENG Dumas  The patient states she was taking her dog out on 3/1/23 and there was a aguilar in her back yard so the dog pulled on the leash and her hand slammed into a door  She states she noted immediate pain and deformity and she did pull on the finger and her sister taped it  She states the next day her finger was discolored and she presented to urgent care where x-rays were taken and she was placed in a splint and told to follow up with orthopedics  She has been tolerating the splint well  She states in the splint, she felt as though her small finger was overlapping her ring finger and it was painful  She notes pain to the base of her small finger proximal phalanx  She also notes bruising  She has been taking Ibuprofen OTC for pain  She works as a caregiver supervisor  I have personally reviewed all the relevant PMH, PSH, SH, FH, Medications and allergies      PAST MEDICAL HISTORY:  Past Medical History:   Diagnosis Date   • Kidney stones     started w/ kidney stones 1 5 years ago approx         PAST SURGICAL HISTORY:  Past Surgical History:   Procedure Laterality Date   • [place] LITHOTRIPSY XTRCORP SHOCK WAVE Left 10/11/2016    Procedure: ESWL ;  Surgeon: Shona Epstein MD;  [facility];  Service: Urology   • WISDOM TOOTH EXTRACTION         FAMILY HISTORY:  Family History   Problem Relation Age of Onset   • Nephrolithiasis Mother        SOCIAL HISTORY:  Social History     Tobacco Use   • Smoking status: Former   • Smokeless tobacco: Never   Vaping Use   • Vaping Use: Never used   Substance Use Topics   • Alcohol use: Not Currently     Comment: socially   • Drug use: No       MEDICATIONS:    Current Outpatient Medications:   •  HYDROcodone-acetaminophen (NORCO) 5-325 mg per tablet, Take 1 tablet by mouth every 6 (six) hours as needed for painMax Daily Amount: 4 tablets (Patient not taking: Reported on 1/22/2021), Disp: 8 tablet, Rfl: 0  •  ibuprofen (MOTRIN) 400 mg tablet, Take 1 tablet (400 mg total) by mouth every 6 (six) hours as needed for mild pain for up to 3 days, Disp: 12 tablet, Rfl: 0  •  meclizine (ANTIVERT) 25 mg tablet, Take 25 mg by mouth 3 (three) times a day as needed for dizziness, Disp: , Rfl:   •  metoclopramide (REGLAN) 10 mg tablet, Take 1 tablet (10 mg total) by mouth every 6 (six) hours (Patient not taking: Reported on 11/21/2020), Disp: 30 tablet, Rfl: 0  •  ondansetron (ZOFRAN-ODT) 4 mg disintegrating tablet, Take 1 tablet (4 mg total) by mouth every 8 (eight) hours as needed for nausea or vomiting (Patient not taking: Reported on 11/21/2020), Disp: 20 tablet, Rfl: 0  •  oxyCODONE (OXY-IR) 5 MG capsule, Take 1 capsule (5 mg total) by mouth every 6 (six) hours as needed for severe pain for up to 3 doses SUBSTITUTE OXYCODONE TABLETS FOR CAPSULESMax Daily Amount: 20 mg, Disp: 3 capsule, Rfl: 0  •  pantoprazole (PROTONIX) 20 mg tablet, Take 20 mg by mouth daily, Disp: , Rfl:   •  phenazopyridine (PYRIDIUM) 200 mg tablet, Take 1 tablet (200 mg total) by mouth 3 (three) times a day (Patient not taking: Reported on 11/21/2020), Disp: 6 tablet, Rfl: 0    ALLERGIES:  No Known Allergies        REVIEW OF SYSTEMS:  Musculoskeletal:        As noted in HPI  All other systems reviewed and are negative  VITALS:  Vitals:    03/06/23 1043   BP: (P) 130/98   Pulse: (P) 101       LABS:  HgA1c: No results found for: HGBA1C  BMP:   Lab Results   Component Value Date    GLUCOSE 91 06/12/2015    CALCIUM 9 0 01/22/2021     06/12/2015    K 4 1 01/22/2021    CO2 21 01/22/2021     01/22/2021    BUN 11 01/22/2021    CREATININE 0 56 (L) 01/22/2021       _____________________________________________________  PHYSICAL EXAMINATION:  General: well developed and well nourished, alert, oriented times 3 and appears comfortable  Psychiatric: Normal  HEENT: Normocephalic, Atraumatic Trachea Midline, No torticollis  Pulmonary: No audible wheezing or respiratory distress   Abdomen/GI: Non tender, non distended   Cardiovascular: No pitting edema, 2+ radial pulse   Skin: No masses, erythema, lacerations, fluctation, ulcerations  Neurovascular: Sensation Intact to the Median, Ulnar, Radial Nerve, Motor Intact to the Median, Ulnar, Radial Nerve and Pulses Intact  Musculoskeletal: Normal, except as noted in detailed exam and in HPI  MUSCULOSKELETAL EXAMINATION:  Left small finger   TTP fx site  Bruising palmar and dorsal aspect  Able to move all digits  Some likely cross over deformity of small finger over ring finger when flexing fingers half way down  Compartments soft  Brisk capillary refill     ___________________________________________________  STUDIES REVIEWED:  Xrays of the left small finger were reviewed in PACS by Dr Phillip Mc and demonstrate left small finger oblique and displaced proximal phalanx fracture   15 degrees extension at fracture site noted on lateral projection        PROCEDURES PERFORMED:  Splint application    Date/Time: 3/6/2023 11:25 AM  Performed by: Corrine Bolden MD  Authorized by: Corrine Bolden MD   Universal Protocol:  Consent: Verbal consent obtained  Consent given by: patient  Patient identity confirmed: verbally with patient      Procedure details:     Laterality:  Left    Location:  Hand    Hand:  L hand    Splint type:  Radial gutter    Supplies:  Cotton padding, elastic bandage and Ortho-Glass  Post-procedure details:     Patient tolerance of procedure:   Tolerated well, no immediate complications           _____________________________________________________      Scribe Attestation    I,:  Romi Pacheco MA am acting as a scribe while in the presence of the attending physician :       I,:  Serafin Guajardo MD personally performed the services described in this documentation    as scribed in my presence :

## 2023-03-06 NOTE — H&P (VIEW-ONLY)
ORTHOPAEDIC HAND, WRIST, AND ELBOW OFFICE  VISIT       ASSESSMENT/PLAN:      Diagnoses and all orders for this visit:    Closed displaced fracture of proximal phalanx of left little finger, initial encounter  -     Splint application  -     Case request operating room: CLOSED REDUCTION PERCUTANEOUS PINNING VERSUS OPEN REDUCTION W/ INTERNAL FIXATION (ORIF) - LEFT SMALL  FINGER PROXIMAL PHALANX; Standing  -     Case request operating room: CLOSED REDUCTION PERCUTANEOUS PINNING VERSUS OPEN REDUCTION W/ INTERNAL FIXATION (ORIF) - LEFT SMALL  FINGER PROXIMAL PHALANX    Other orders  -     Diet NPO; Sips with meds; Standing  -     Void on call to OR; Standing  -     Insert peripheral IV; Standing  -     ceFAZolin (ANCEF) 2,000 mg in dextrose 5 % 100 mL IVPB      70-year-old female with left small finger proximal phalanx fracture, DOI 3/1/23    X-rays were reviewed in the office today  I discussed with the patient that there is likely malrotation on exam (clinical photograph taken in Media) and further supported by displacement noted on x-rays however, this is difficult to fully assess as the patient is unable to make a full fist on exam today  Surgical versus non operative intervention was discussed  The patient elected to proceed with surgical intervention in the form of left small finger proximal phalanx CRPP versus ORIF  Risks and benefits were discussed at length  Risks of the surgery are inclusive of but not limited to bleeding, infection, nerve injury, blood clot, worsening of symptoms, not achieving the anticipated results, persistent stiffness, weakness and the need for additional surgery  Discussed in particular risk of malrotation and stiffness postoperatively  The patient verbally stated they understood those risks and would like to proceed with the surgery  The patient was placed in a ulnar gutter splint in the office today  The patient verbalized understanding of exam findings and treatment plan   We engaged in the shared decision-making process and treatment options were discussed at length with the patient  Surgical and conservative management discussed today along with risks and benefits  Follow Up: After surgery       To Do Next Visit:  X-rays of the  left  small finger and Cast/splint off prior to x-ray      Operative Discussions:  Fracture Operative Treatment: The physiology of a fractured bone was discussed with the patient today  With displaced fractures, operative treatment often results in a functional recovery  Typically, these fractures undergo reduction either through percutaneous or open methods depending on the location and fracture pattern  Radiographs are typically taken at intervals throughout the fracture healing ensure maintenance of reduction and alignment  If the fracture loses its alignment, revision surgery may be required  Medical conditions such as diabetes, osteoporosis, vitamin D deficiency, and a history of or exposure to smoking may delay or prevent fracture healing  The risks and benefits of the procedure were explained to the patient, which include, but are not limited to: Bleeding, infection, recurrence, pain, scar, malunion, nonunion, damage to tendons, damage to nerves, and damage to blood vessels, and complications related to anesthesia, failure to give desire result, need for more surgery  These risks, along with alternative conservative treatment options, and postoperative protocols were voiced back and understood by the patient  All questions were answered to the patient's satisfaction  The patient agrees to comply with a standard postoperative protocol, and is willing to proceed  Education was provided via written and auditory forms  There were no barriers to learning  Written handouts regarding wound care, incision and scar care, and general preoperative information was provided to the patient    Prior to surgery, the patient may be requested to stop all anti-inflammatory medications  Prophylactic aspirin, Plavix, and Coumadin may be allowed to be continued  Medications including vitamin E , ginkgo, and fish oil are requested to be stopped approximately one week prior to surgery  Hypertensive medications and beta blockers, if taken, should be continued  Arslan Joseph MD  Attending, Orthopaedic Surgery  Hand, Wrist, and Elbow Surgery  [facility]    ____________________________________________________________________________________________________________________________________________      CHIEF COMPLAINT:  Chief Complaint   Patient presents with   • Left Hand - Pain       SUBJECTIVE:  Daily Lemus is a 32y o  year old LHD female who presents today for evaluation and treatment of left small finger pain  The patient is referred by SHENG Esquivel  The patient states she was taking her dog out on 3/1/23 and there was a aguilar in her back yard so the dog pulled on the leash and her hand slammed into a door  She states she noted immediate pain and deformity and she did pull on the finger and her sister taped it  She states the next day her finger was discolored and she presented to urgent care where x-rays were taken and she was placed in a splint and told to follow up with orthopedics  She has been tolerating the splint well  She states in the splint, she felt as though her small finger was overlapping her ring finger and it was painful  She notes pain to the base of her small finger proximal phalanx  She also notes bruising  She has been taking Ibuprofen OTC for pain  She works as a caregiver supervisor  I have personally reviewed all the relevant PMH, PSH, SH, FH, Medications and allergies      PAST MEDICAL HISTORY:  Past Medical History:   Diagnosis Date   • Kidney stones     started w/ kidney stones 1 5 years ago approx         PAST SURGICAL HISTORY:  Past Surgical History:   Procedure Laterality Date   • [place] LITHOTRIPSY XTRCORP SHOCK WAVE Left 10/11/2016    Procedure: ESWL ;  Surgeon: Isaak Benton MD;  [facility];  Service: Urology   • WISDOM TOOTH EXTRACTION         FAMILY HISTORY:  Family History   Problem Relation Age of Onset   • Nephrolithiasis Mother        SOCIAL HISTORY:  Social History     Tobacco Use   • Smoking status: Former   • Smokeless tobacco: Never   Vaping Use   • Vaping Use: Never used   Substance Use Topics   • Alcohol use: Not Currently     Comment: socially   • Drug use: No       MEDICATIONS:    Current Outpatient Medications:   •  HYDROcodone-acetaminophen (NORCO) 5-325 mg per tablet, Take 1 tablet by mouth every 6 (six) hours as needed for painMax Daily Amount: 4 tablets (Patient not taking: Reported on 1/22/2021), Disp: 8 tablet, Rfl: 0  •  ibuprofen (MOTRIN) 400 mg tablet, Take 1 tablet (400 mg total) by mouth every 6 (six) hours as needed for mild pain for up to 3 days, Disp: 12 tablet, Rfl: 0  •  meclizine (ANTIVERT) 25 mg tablet, Take 25 mg by mouth 3 (three) times a day as needed for dizziness, Disp: , Rfl:   •  metoclopramide (REGLAN) 10 mg tablet, Take 1 tablet (10 mg total) by mouth every 6 (six) hours (Patient not taking: Reported on 11/21/2020), Disp: 30 tablet, Rfl: 0  •  ondansetron (ZOFRAN-ODT) 4 mg disintegrating tablet, Take 1 tablet (4 mg total) by mouth every 8 (eight) hours as needed for nausea or vomiting (Patient not taking: Reported on 11/21/2020), Disp: 20 tablet, Rfl: 0  •  oxyCODONE (OXY-IR) 5 MG capsule, Take 1 capsule (5 mg total) by mouth every 6 (six) hours as needed for severe pain for up to 3 doses SUBSTITUTE OXYCODONE TABLETS FOR CAPSULESMax Daily Amount: 20 mg, Disp: 3 capsule, Rfl: 0  •  pantoprazole (PROTONIX) 20 mg tablet, Take 20 mg by mouth daily, Disp: , Rfl:   •  phenazopyridine (PYRIDIUM) 200 mg tablet, Take 1 tablet (200 mg total) by mouth 3 (three) times a day (Patient not taking: Reported on 11/21/2020), Disp: 6 tablet, Rfl: 0    ALLERGIES:  No Known Allergies        REVIEW OF SYSTEMS:  Musculoskeletal:        As noted in HPI  All other systems reviewed and are negative  VITALS:  Vitals:    03/06/23 1043   BP: (P) 130/98   Pulse: (P) 101       LABS:  HgA1c: No results found for: HGBA1C  BMP:   Lab Results   Component Value Date    GLUCOSE 91 06/12/2015    CALCIUM 9 0 01/22/2021     06/12/2015    K 4 1 01/22/2021    CO2 21 01/22/2021     01/22/2021    BUN 11 01/22/2021    CREATININE 0 56 (L) 01/22/2021       _____________________________________________________  PHYSICAL EXAMINATION:  General: well developed and well nourished, alert, oriented times 3 and appears comfortable  Psychiatric: Normal  HEENT: Normocephalic, Atraumatic Trachea Midline, No torticollis  Pulmonary: No audible wheezing or respiratory distress   Abdomen/GI: Non tender, non distended   Cardiovascular: No pitting edema, 2+ radial pulse   Skin: No masses, erythema, lacerations, fluctation, ulcerations  Neurovascular: Sensation Intact to the Median, Ulnar, Radial Nerve, Motor Intact to the Median, Ulnar, Radial Nerve and Pulses Intact  Musculoskeletal: Normal, except as noted in detailed exam and in HPI  MUSCULOSKELETAL EXAMINATION:  Left small finger   TTP fx site  Bruising palmar and dorsal aspect  Able to move all digits  Some likely cross over deformity of small finger over ring finger when flexing fingers half way down  Compartments soft  Brisk capillary refill     ___________________________________________________  STUDIES REVIEWED:  Xrays of the left small finger were reviewed in PACS by Dr Trevor Salamanca and demonstrate left small finger oblique and displaced proximal phalanx fracture   15 degrees extension at fracture site noted on lateral projection        PROCEDURES PERFORMED:  Splint application    Date/Time: 3/6/2023 11:25 AM  Performed by: Rhiannon Prince MD  Authorized by: Rhiannon Prince MD   Universal Protocol:  Consent: Verbal consent obtained  Consent given by: patient  Patient identity confirmed: verbally with patient      Procedure details:     Laterality:  Left    Location:  Hand    Hand:  L hand    Splint type:  Radial gutter    Supplies:  Cotton padding, elastic bandage and Ortho-Glass  Post-procedure details:     Patient tolerance of procedure:   Tolerated well, no immediate complications           _____________________________________________________      Scribe Attestation    I,:  Romi Pacheco MA am acting as a scribe while in the presence of the attending physician :       I,:  Amelia Ross MD personally performed the services described in this documentation    as scribed in my presence :

## 2023-03-07 ENCOUNTER — ANESTHESIA EVENT (OUTPATIENT)
Dept: PERIOP | Facility: AMBULARY SURGERY CENTER | Age: 28
End: 2023-03-07

## 2023-03-07 NOTE — PRE-PROCEDURE INSTRUCTIONS
No outpatient medications have been marked as taking for the 3/9/23 encounter [facility] H[facility] Encounter)  Pt does not take any daily medications  Medication instructions for day surgery reviewed  Please use only a sip of water to take your instructed medications  Avoid all over the counter vitamins, supplements and NSAIDS for one week prior to surgery per anesthesia guidelines  Tylenol is ok to take as needed  You will receive a call one business day prior to surgery with an arrival time and hospital directions  If your surgery is scheduled on a Monday, the hospital will be calling you on the Friday prior to your surgery  If you have not heard from anyone by 8pm, please call the hospital supervisor through the hospital  at [phone]  Sampson Mahmood [phone])  Do not eat or drink anything after midnight the night before your surgery, including candy, mints, lifesavers, or chewing gum  Do not drink alcohol 24hrs before your surgery  Try not to smoke at least 24hrs before your surgery  Follow the pre surgery showering instructions as listed in the [facility] Surgical Experience Booklet” or otherwise provided by your surgeon's office  Do not shave the surgical area 24 hours before surgery  Do not apply any lotions, creams, including makeup, cologne, deodorant, or perfumes after showering on the day of your surgery  No contact lenses, eye make-up, or artificial eyelashes  Remove nail polish, including gel polish, and any artificial, gel, or acrylic nails if possible  Remove all jewelry including rings and body piercing jewelry  Wear causal clothing that is easy to take on and off  Consider your type of surgery  Keep any valuables, jewelry, piercings at home  Please bring any specially ordered equipment (sling, braces) if indicated  Arrange for a responsible person to drive you to and from the hospital on the day of your surgery  Visitor Guidelines discussed       Call the surgeon's office with any new illnesses, exposures, or additional questions prior to surgery  Please reference your [facility] Surgical Experience Booklet” for additional information to prepare for your upcoming surgery

## 2023-03-09 ENCOUNTER — APPOINTMENT (OUTPATIENT)
Dept: RADIOLOGY | Facility: AMBULARY SURGERY CENTER | Age: 28
End: 2023-03-09

## 2023-03-09 ENCOUNTER — HOSPITAL ENCOUNTER (OUTPATIENT)
Facility: AMBULARY SURGERY CENTER | Age: 28
Setting detail: OUTPATIENT SURGERY
Discharge: HOME | End: 2023-03-09
Attending: STUDENT IN AN ORGANIZED HEALTH CARE EDUCATION/TRAINING PROGRAM | Admitting: STUDENT IN AN ORGANIZED HEALTH CARE EDUCATION/TRAINING PROGRAM

## 2023-03-09 ENCOUNTER — TELEPHONE (OUTPATIENT)
Dept: OBGYN CLINIC | Facility: CLINIC | Age: 28
End: 2023-03-09

## 2023-03-09 ENCOUNTER — ANESTHESIA (OUTPATIENT)
Dept: PERIOP | Facility: AMBULARY SURGERY CENTER | Age: 28
End: 2023-03-09

## 2023-03-09 VITALS
WEIGHT: 145 LBS | SYSTOLIC BLOOD PRESSURE: 117 MMHG | TEMPERATURE: 97.5 F | BODY MASS INDEX: 26.68 KG/M2 | RESPIRATION RATE: 16 BRPM | HEIGHT: 62 IN | OXYGEN SATURATION: 100 % | DIASTOLIC BLOOD PRESSURE: 81 MMHG | HEART RATE: 77 BPM

## 2023-03-09 DIAGNOSIS — Z47.89 AFTERCARE FOLLOWING SURGERY OF THE MUSCULOSKELETAL SYSTEM: Primary | ICD-10-CM

## 2023-03-09 DEVICE — C-WIRE PAK DOUBLE ENDED ORTHOPAEDIC WIRE, SPADE, .045" (1.14 MM)
Type: IMPLANTABLE DEVICE | Site: FINGER | Status: FUNCTIONAL
Brand: C-WIRE

## 2023-03-09 RX ORDER — ONDANSETRON 2 MG/ML
4 INJECTION INTRAMUSCULAR; INTRAVENOUS EVERY 4 HOURS PRN
Status: DISCONTINUED | OUTPATIENT
Start: 2023-03-09 | End: 2023-03-09 | Stop reason: HOSPADM

## 2023-03-09 RX ORDER — CEFAZOLIN SODIUM 2 G/50ML
SOLUTION INTRAVENOUS AS NEEDED
Status: DISCONTINUED | OUTPATIENT
Start: 2023-03-09 | End: 2023-03-09

## 2023-03-09 RX ORDER — SODIUM CHLORIDE, SODIUM LACTATE, POTASSIUM CHLORIDE, CALCIUM CHLORIDE 600; 310; 30; 20 MG/100ML; MG/100ML; MG/100ML; MG/100ML
125 INJECTION, SOLUTION INTRAVENOUS CONTINUOUS
Status: DISCONTINUED | OUTPATIENT
Start: 2023-03-09 | End: 2023-03-09 | Stop reason: HOSPADM

## 2023-03-09 RX ORDER — DEXAMETHASONE SODIUM PHOSPHATE 10 MG/ML
INJECTION, SOLUTION INTRAMUSCULAR; INTRAVENOUS AS NEEDED
Status: DISCONTINUED | OUTPATIENT
Start: 2023-03-09 | End: 2023-03-09

## 2023-03-09 RX ORDER — OXYCODONE HYDROCHLORIDE AND ACETAMINOPHEN 5; 325 MG/1; MG/1
1 TABLET ORAL EVERY 6 HOURS PRN
Qty: 15 TABLET | Refills: 0 | Status: SHIPPED | OUTPATIENT
Start: 2023-03-09 | End: 2023-03-18

## 2023-03-09 RX ORDER — SODIUM CHLORIDE, SODIUM LACTATE, POTASSIUM CHLORIDE, CALCIUM CHLORIDE 600; 310; 30; 20 MG/100ML; MG/100ML; MG/100ML; MG/100ML
INJECTION, SOLUTION INTRAVENOUS CONTINUOUS PRN
Status: DISCONTINUED | OUTPATIENT
Start: 2023-03-09 | End: 2023-03-09

## 2023-03-09 RX ORDER — MAGNESIUM HYDROXIDE 1200 MG/15ML
LIQUID ORAL AS NEEDED
Status: DISCONTINUED | OUTPATIENT
Start: 2023-03-09 | End: 2023-03-09 | Stop reason: HOSPADM

## 2023-03-09 RX ORDER — FENTANYL CITRATE/PF 50 MCG/ML
50 SYRINGE (ML) INJECTION
Status: DISCONTINUED | OUTPATIENT
Start: 2023-03-09 | End: 2023-03-09 | Stop reason: HOSPADM

## 2023-03-09 RX ORDER — PROPOFOL 10 MG/ML
INJECTION, EMULSION INTRAVENOUS CONTINUOUS PRN
Status: DISCONTINUED | OUTPATIENT
Start: 2023-03-09 | End: 2023-03-09

## 2023-03-09 RX ORDER — CEFAZOLIN SODIUM 2 G/50ML
2000 SOLUTION INTRAVENOUS ONCE
Status: DISCONTINUED | OUTPATIENT
Start: 2023-03-09 | End: 2023-03-09 | Stop reason: HOSPADM

## 2023-03-09 RX ORDER — METOCLOPRAMIDE HYDROCHLORIDE 5 MG/ML
10 INJECTION INTRAMUSCULAR; INTRAVENOUS EVERY 4 HOURS PRN
Status: DISCONTINUED | OUTPATIENT
Start: 2023-03-09 | End: 2023-03-09 | Stop reason: HOSPADM

## 2023-03-09 RX ORDER — FENTANYL CITRATE 50 UG/ML
INJECTION, SOLUTION INTRAMUSCULAR; INTRAVENOUS AS NEEDED
Status: DISCONTINUED | OUTPATIENT
Start: 2023-03-09 | End: 2023-03-09

## 2023-03-09 RX ORDER — MIDAZOLAM HYDROCHLORIDE 2 MG/2ML
INJECTION, SOLUTION INTRAMUSCULAR; INTRAVENOUS AS NEEDED
Status: DISCONTINUED | OUTPATIENT
Start: 2023-03-09 | End: 2023-03-09

## 2023-03-09 RX ORDER — OXYCODONE HYDROCHLORIDE 5 MG/1
5 TABLET ORAL EVERY 6 HOURS PRN
Status: DISCONTINUED | OUTPATIENT
Start: 2023-03-09 | End: 2023-03-09 | Stop reason: HOSPADM

## 2023-03-09 RX ORDER — ONDANSETRON 2 MG/ML
INJECTION INTRAMUSCULAR; INTRAVENOUS AS NEEDED
Status: DISCONTINUED | OUTPATIENT
Start: 2023-03-09 | End: 2023-03-09

## 2023-03-09 RX ORDER — KETOROLAC TROMETHAMINE 30 MG/ML
INJECTION, SOLUTION INTRAMUSCULAR; INTRAVENOUS AS NEEDED
Status: DISCONTINUED | OUTPATIENT
Start: 2023-03-09 | End: 2023-03-09

## 2023-03-09 RX ADMIN — PROPOFOL 100 MCG/KG/MIN: 10 INJECTION, EMULSION INTRAVENOUS at 07:33

## 2023-03-09 RX ADMIN — KETOROLAC TROMETHAMINE 15 MG: 30 INJECTION, SOLUTION INTRAMUSCULAR at 08:49

## 2023-03-09 RX ADMIN — OXYCODONE HYDROCHLORIDE 5 MG: 5 TABLET ORAL at 09:22

## 2023-03-09 RX ADMIN — MIDAZOLAM 2 MG: 1 INJECTION INTRAMUSCULAR; INTRAVENOUS at 07:31

## 2023-03-09 RX ADMIN — SODIUM CHLORIDE, SODIUM LACTATE, POTASSIUM CHLORIDE, AND CALCIUM CHLORIDE: .6; .31; .03; .02 INJECTION, SOLUTION INTRAVENOUS at 07:31

## 2023-03-09 RX ADMIN — FENTANYL CITRATE 50 MCG: 50 INJECTION INTRAMUSCULAR; INTRAVENOUS at 07:33

## 2023-03-09 RX ADMIN — FENTANYL CITRATE 25 MCG: 50 INJECTION INTRAMUSCULAR; INTRAVENOUS at 08:01

## 2023-03-09 RX ADMIN — CEFAZOLIN SODIUM 2000 MG: 2 SOLUTION INTRAVENOUS at 07:32

## 2023-03-09 RX ADMIN — FENTANYL CITRATE 50 MCG: 50 INJECTION INTRAMUSCULAR; INTRAVENOUS at 08:57

## 2023-03-09 RX ADMIN — FENTANYL CITRATE 50 MCG: 50 INJECTION INTRAMUSCULAR; INTRAVENOUS at 08:49

## 2023-03-09 RX ADMIN — FENTANYL CITRATE 25 MCG: 50 INJECTION INTRAMUSCULAR; INTRAVENOUS at 07:41

## 2023-03-09 RX ADMIN — DEXAMETHASONE SODIUM PHOSPHATE 10 MG: 10 INJECTION INTRAMUSCULAR; INTRAVENOUS at 07:33

## 2023-03-09 RX ADMIN — ONDANSETRON 4 MG: 2 INJECTION INTRAMUSCULAR; INTRAVENOUS at 07:33

## 2023-03-09 RX ADMIN — FENTANYL CITRATE 50 MCG: 50 INJECTION INTRAMUSCULAR; INTRAVENOUS at 08:41

## 2023-03-09 NOTE — DISCHARGE INSTR - AVS FIRST PAGE
Post Operative Instructions    You have had surgery on your arm today, please read and follow the information below:  Elevate your hand above your elbow during the next 24-48 hours to help with swelling  Place your hand and arm over your head with motion at your shoulder three times a day  Do not apply any cream/ointment/oil to your incisions including antibiotics (I e Neosporin)  Do not use peroxide to clean your wound   Do not soak your hands in standing water (dishwater, tubs, Jacuzzi's, pools, etc ) until given permission (typically 2-3 weeks after injury)    Call the office if you notice any:  Increased numbness or tingling of your hand or fingers that is not relieved with elevation  Increasing pain that is not controlled with medication  Difficulty chewing, breathing, swallowing  Chest pains or shortness of breath  Fever over 101 4 degrees  Bandage: Do NOT remove bandage until follow-up appointment  Motion: Move fingers into a fist 5 times a day, DO NOT move any splinted fingers  Weight bearing status: The operated extremity should be non-weight bearing until further notice  Ice: Ice for 10 minutes every hour as needed for swelling x 24 hours  Sling: No sling necessary  Pain medication:   Percocet 1 tab every 6 hours AS NEEDED for pain     Therapy: An order for hand therapy has been placed in your chart  Please call [phone] at your earliest convenience to get an appointment scheduled for two weeks from now  The earlier you call, the better chance of being able to get an appointment time that works for you  Follow-up Appointment: 10-14 days  Please call the office if you have any questions or concerns regarding your post-operative care

## 2023-03-09 NOTE — ANESTHESIA POSTPROCEDURE EVALUATION
Post-Op Assessment Note    No notable events documented      BP   112/71   Temp   97 6   Pulse  89   Resp   14   SpO2   99% on RA

## 2023-03-09 NOTE — INTERVAL H&P NOTE
H&P reviewed  After examining the patient I find no changes in the patients condition since the H&P had been written      Vitals:    03/09/23 0654   BP: 126/84   Pulse: 79   Resp: 18   Temp: 97 7 °F (36 5 °C)   SpO2: 98%

## 2023-03-09 NOTE — OP NOTE
OPERATIVE REPORT  PATIENT NAME: Liv Cuhn    :  1995  MRN: [id number]  Pt Location: [facility]    SURGERY DATE: 3/9/2023    Surgeon(s) and Role:     * Tete De Paz MD - Primary     * Naveen Saucedo PA-C - Assisting    Physician Assistant needed: Physician assistant was needed to assist with reduction, pinning, splinting  No qualified resident was available  Preop Diagnosis:  Closed displaced fracture of proximal phalanx of left little (fifth) finger    Post-Op Diagnosis Codes:    Closed displaced fracture of proximal phalanx of left little (fifth) finger    Procedure(s):  Left - CLOSED REDUCTION PERCUTANEOUS PINNING  LEFT SMALL  FINGER PROXIMAL PHALANX    Specimen(s):  * No specimens in log *    Estimated Blood Loss:   Minimal    Drains:  * No LDAs found *    Anesthesia Type:   IV Sedation with Anesthesia    Operative Indications:  Patient is a 71-year-old female that sustained a left small finger proximal phalanx fracture after her finger got caught in her dog's leash and her dog ran off  She was evaluated in clinic and radiographs were reviewed  She had an oblique fracture through the proximal phalanx with some displacement and extension at the fracture site  We discussed that the radiographs also likely demonstrated a rotational component of the fracture  Additionally, it appeared she had cross over of small finger under ring finger  Given radiographic findings, we recommended operative management  Operative Findings:  Left small finger proximal phalanx fracture that was able to be closed reduced and pinned  Complications:   None    Procedure and Technique:  Patient was identified in the preoperative holding area  Surgical site was marked patient participation  Patient was taken back to the operating theater  Arm tourniquet was applied, however, never inflated  Extremities prepped and draped in typical fashion    Formal timeout was performed confirming site, patient, procedure  All present were in agreement  A 50-50 mixture of 0 25% bupivacaine and 1% lidocaine was used for digital block  Finger tenodesis effect was noted and she had crossover of the small finger under the ring finger  Fluoroscopic images were taken and confirmed that a reasonable reduction could be achieved with closed technique  0 045 K wires were introduced in the shaft perpendicular to the fracture site  Unfortunately, these pins were unable to be passed in the appropriate manner  A 0 045 K wire was introduced over the proximal ulnar base of the proximal phalanx  This pin was sent obliquely through the proximal phalanx past the fracture site to the distal fragment  A 0 045 K wire was passed through the metacarpal head into the proximal phalanx base, and into the proximal phalanx distal fragment in Cecily Springer fashion  Final radiographs were performed confirming reasonable alignment of fracture and fracture fragments  Rotation was assessed and there was no rotational deformity defect that was noted with finger range of motion  K wires were clipped and capped  Wound was dressed with Xeroform, 4 x 4, cast padding, ulnar gutter splint, and Ace bandage  Patient was taken to PACU in stable condition     I was present for the entire procedure    Patient Disposition:  PACU         SIGNATURE: Sarah Piedra MD  DATE: March 9, 2023  TIME: 8:50 AM

## 2023-03-09 NOTE — TELEPHONE ENCOUNTER
----- Message from Aryan Marcos PA-C sent at 3/9/2023  8:46 AM EST -----  Pt had surgery today  Her follow up is scheduled for 8 days from now  Can we please make her follow up closer to 2 weeks? Thanks so much!

## 2023-03-09 NOTE — ANESTHESIA PREPROCEDURE EVALUATION
Procedure:  CLOSED REDUCTION PERCUTANEOUS PINNING VERSUS OPEN REDUCTION W/ INTERNAL FIXATION (ORIF) - LEFT SMALL  FINGER PROXIMAL PHALANX (Left: Finger)    Relevant Problems   /RENAL   (+) Nephrolithiasis        Physical Exam    Airway    Mallampati score: II  TM Distance: <3 FB  Neck ROM: full     Dental   No notable dental hx     Cardiovascular      Pulmonary      Other Findings        Anesthesia Plan  ASA Score- 1     Anesthesia Type- IV sedation with anesthesia with ASA Monitors  Additional Monitors:   Airway Plan:           Plan Factors-Exercise tolerance (METS): >4 METS  Chart reviewed  Existing labs reviewed  Patient summary reviewed              Induction-     Postoperative Plan-     Informed Consent-

## 2023-03-13 ENCOUNTER — TELEPHONE (OUTPATIENT)
Dept: OBGYN CLINIC | Facility: HOSPITAL | Age: 28
End: 2023-03-13

## 2023-03-13 NOTE — TELEPHONE ENCOUNTER
Tried to call pt, no answer. Left VM explaining that she can get a new splint from us at her postop appointment, to just keep on her surgical splint until then. Advised her to call if any Qs/concerns.

## 2023-03-13 NOTE — TELEPHONE ENCOUNTER
Caller: Patient    Doctor: Alicia Lugo    Reason for call: Patient states she was advised to call and speak w/Kari Kramer re:scheduling appt for splint.  Requests call back    Call back#: [phone]

## 2023-03-18 ENCOUNTER — NURSE TRIAGE (OUTPATIENT)
Dept: OTHER | Facility: OTHER | Age: 28
End: 2023-03-18

## 2023-03-18 ENCOUNTER — TELEPHONE (OUTPATIENT)
Dept: OBGYN CLINIC | Facility: HOSPITAL | Age: 28
End: 2023-03-18

## 2023-03-18 DIAGNOSIS — S62.617A CLOSED DISPLACED FRACTURE OF PROXIMAL PHALANX OF LEFT LITTLE FINGER, INITIAL ENCOUNTER: Primary | ICD-10-CM

## 2023-03-18 RX ORDER — OXYCODONE HYDROCHLORIDE 5 MG/1
5 TABLET ORAL EVERY 6 HOURS PRN
Qty: 12 TABLET | Refills: 0 | Status: SHIPPED | OUTPATIENT
Start: 2023-03-18

## 2023-03-18 NOTE — TELEPHONE ENCOUNTER
Regarding: Post Displaced Fx Left Pinky Surgery Pain and felt a Pop last night same finger  ----- Message from Qasim Irizarry sent at 3/18/2023  6:24 PM EDT -----  " I had surgery last Thursday for a displaced Fx on my L Pinky Finger  I called the office this morning for guidance for Pain control and for advise for a  pop in my L Pinky Finger that happened last night   But no one called me back today "

## 2023-03-18 NOTE — TELEPHONE ENCOUNTER
Answer Assessment - Initial Assessment Questions  1  ONSET: "When did the pain start?"       Last night while walking up the stairs she felt a pop in her left 5th finger and the pain followed  2  LOCATION and RADIATION: "Where is the pain located?"  (e g , fingertip, around nail, joint, entire   finger)       Left 5th pinky finger  3  SEVERITY: "How bad is the pain?" "What does it keep you from doing?"   (Scale 1-10; or mild, moderate, severe)   - MILD (1-3): doesn't interfere with normal activities  - MODERATE (4-7): interferes with normal activities or awakens from sleep  - SEVERE (8-10): excruciating pain, unable to hold a glass of water or bend finger even a little  #8-9 after she felt the pop, now #5 after having it iced continually  Ext St  Tylenol is not helping any longer as it was prior to the popping occurring  4  APPEARANCE: "What does the finger look like?" (e g , redness, swelling, bruising, pallor)      normal  5  WORK OR EXERCISE: "Has there been any recent work or exercise that involved this part of the body?"      Denies  6  CAUSE: "What do you think is causing the pain?"      Unsure  7  AGGRAVATING FACTORS: "What makes the pain worse?" (e g , using computer)      Moving her hand  8  OTHER SYMPTOMS: "Do you have any other symptoms?" (e g , fever, neck pain, numbness)      Numbness in left 5th pink finger due to having ice on it all day  9  PREGNANCY: "Is there any chance you are pregnant?" "When was your last menstrual period?"      No    Protocols used:  FINGER PAIN-ADULT-

## 2023-03-18 NOTE — TELEPHONE ENCOUNTER
Paola Vallejo    [birth year]   MRN# [id number]   Sx 3/9/23  Displaced fx L Lissette L hand popped last night severe pain, tylenol isn't helping   No more Oxycodone left, wrapped hand- Pharm:MERCEDEZ Arias: [phone]  Sent message to on call Dr Palacio

## 2023-03-19 NOTE — PROGRESS NOTES
Called and spoke with patient  Plan to see patient at 11 am on Monday with repeat x-rays of left small finger

## 2023-03-19 NOTE — TELEPHONE ENCOUNTER
Patient was so relieved that she was crying after receiving a call back from Dr Phillip Mc  She will  pain med that Dr Phillip Mc sent in for you  Ice and elevation to continue  Keep appointment for 1100 Monday with xrays to be done then

## 2023-03-19 NOTE — TELEPHONE ENCOUNTER
Reason for Disposition  • [1] SEVERE pain (e g , excruciating) AND [2] not improved after 2 hours of pain medicine    Protocols used:  FINGER PAIN-ADULT-AH

## 2023-03-20 ENCOUNTER — OFFICE VISIT (OUTPATIENT)
Dept: OBGYN CLINIC | Facility: CLINIC | Age: 28
End: 2023-03-20

## 2023-03-20 ENCOUNTER — HOSPITAL ENCOUNTER (OUTPATIENT)
Dept: RADIOLOGY | Facility: HOSPITAL | Age: 28
Discharge: HOME | End: 2023-03-20
Attending: STUDENT IN AN ORGANIZED HEALTH CARE EDUCATION/TRAINING PROGRAM

## 2023-03-20 DIAGNOSIS — S62.617A CLOSED DISPLACED FRACTURE OF PROXIMAL PHALANX OF LEFT LITTLE FINGER, INITIAL ENCOUNTER: ICD-10-CM

## 2023-03-20 DIAGNOSIS — S62.617D CLOSED DISPLACED FRACTURE OF PROXIMAL PHALANX OF LEFT LITTLE FINGER WITH ROUTINE HEALING, SUBSEQUENT ENCOUNTER: Primary | ICD-10-CM

## 2023-03-20 RX ORDER — ACETAMINOPHEN 500 MG
500 TABLET ORAL EVERY 6 HOURS PRN
COMMUNITY

## 2023-03-20 NOTE — LETTER
March 20, 2023     Patient: Dedrick Read  YOB: 1995  Date of Visit: 3/20/2023      To Whom it May Concern:    Greyson Dobson is under my professional care  Yana Bartholomew was seen in my office on 3/20/2023  Yana Bartholomew may return to work with no use of her LUE  If you have any questions or concerns, please don't hesitate to call           Sincerely,          Magali Francis MD

## 2023-03-20 NOTE — PROGRESS NOTES
Assessment/Plan:  Patient ID: Evelyn Dorado 32 y o  female   Surgery: Closed Reduction Percutaneous Pinning  Left Small  Finger Proximal Phalanx - Left  Date of Surgery: 3/9/2023    The patient is doing well postoperatively  X-rays were reviewed in the office today  The patient was placed in a ulnar gutter splint in the office today  She was advised no weightbearing with her LUE  She may return to work with no use of her LUE and a note was provided for this today  Follow Up:  4 weeks    To Do Next Visit:  X-rays of the  left  small finger and Pins out      CHIEF COMPLAINT:  Chief Complaint   Patient presents with   • Left Hand - Post-op         SUBJECTIVE:  Negar Daniels is a 32y o  year old female who presents for follow up 11 days s/p Closed Reduction Percutaneous Pinning  Left Small  Finger Proximal Phalanx - Left  Today patient states she is doing well  She states she did feel a pop at small finger MCP and severe pain in the left hand over the weekend  She states the finger has been twitching  PHYSICAL EXAMINATION:  General: well developed and well nourished, alert, oriented times 3 and appears comfortable  Psychiatric: Normal    MUSCULOSKELETAL EXAMINATION:  Incision: Clean, dry, intact  Surgery Site: normal, no evidence of infection  pins intact  No obvious rotational deformity with fingers in extension  Range of Motion: As expected  Neurovascular status: Neuro intact, good cap refill         STUDIES REVIEWED:  Xrays of the left small finger were reviewed in PACS by Dr Nick Stoner and demonstrate s/p CRPP proximal phalanx left small finger       PROCEDURES PERFORMED:  Splint application    Date/Time: 3/20/2023 11:36 AM  Performed by: Sandrita Wall MD  Authorized by: Sandrita Wall MD   Universal Protocol:  Consent: Verbal consent obtained    Consent given by: patient  Patient identity confirmed: verbally with patient      Procedure details:     Laterality:  Left    Location:  Wrist Wrist:  L wrist    Splint type:  Ulnar gutter    Supplies:  Cotton padding, elastic bandage and Ortho-Glass  Post-procedure details:     Patient tolerance of procedure:   Tolerated well, no immediate complications           Scribe Attestation    I,:  Romi Pacheco MA am acting as a scribe while in the presence of the attending physician :       I,:  Dalila Salgado MD personally performed the services described in this documentation    as scribed in my presence :

## 2023-04-20 DIAGNOSIS — S62.617A CLOSED DISPLACED FRACTURE OF PROXIMAL PHALANX OF LEFT LITTLE FINGER, INITIAL ENCOUNTER: ICD-10-CM

## 2023-04-20 RX ORDER — OXYCODONE HYDROCHLORIDE 5 MG/1
5 TABLET ORAL EVERY 8 HOURS PRN
Qty: 5 TABLET | Refills: 0 | Status: SHIPPED | OUTPATIENT
Start: 2023-04-20

## 2023-04-21 ENCOUNTER — HOSPITAL ENCOUNTER (OUTPATIENT)
Dept: RADIOLOGY | Facility: HOSPITAL | Age: 28
Discharge: HOME | End: 2023-04-21
Attending: STUDENT IN AN ORGANIZED HEALTH CARE EDUCATION/TRAINING PROGRAM

## 2023-04-21 DIAGNOSIS — S62.617D CLOSED DISPLACED FRACTURE OF PROXIMAL PHALANX OF LEFT LITTLE FINGER WITH ROUTINE HEALING, SUBSEQUENT ENCOUNTER: ICD-10-CM

## 2023-04-25 ENCOUNTER — OFFICE VISIT (OUTPATIENT)
Dept: OBGYN CLINIC | Facility: CLINIC | Age: 28
End: 2023-04-25

## 2023-04-25 VITALS
WEIGHT: 144 LBS | HEART RATE: 98 BPM | SYSTOLIC BLOOD PRESSURE: 121 MMHG | HEIGHT: 62 IN | DIASTOLIC BLOOD PRESSURE: 87 MMHG | BODY MASS INDEX: 26.5 KG/M2

## 2023-04-25 DIAGNOSIS — S62.617D CLOSED DISPLACED FRACTURE OF PROXIMAL PHALANX OF LEFT LITTLE FINGER WITH ROUTINE HEALING, SUBSEQUENT ENCOUNTER: Primary | ICD-10-CM

## 2023-04-25 NOTE — PROGRESS NOTES
Assessment/Plan:  Patient ID: 32 y o  female   Surgery: Closed Reduction Percutaneous Pinning  Left Small  Finger Proximal Phalanx - Left  Date of Surgery: 3/9/2023    The patient is doing well postoperatively  Her motion has been improving  I did discuss the pain she was experiencing was likely scar tissue and muscle spasms  Discussed she can use heat/ice as needed  She will continue with physician directed HEP for range of motion  She was advised to work on scar massage  Follow Up:  2-3 weeks     To Do Next Visit:  X-rays of the  left  small finger      CHIEF COMPLAINT:  Chief Complaint   Patient presents with   • Left Little Finger - Post-op         SUBJECTIVE:  Patient is a 32y o  year old female who presents for follow up after Closed Reduction Percutaneous Pinning  Left Small  Finger Proximal Phalanx - Left  Today patient states she is doing well  She states the piercing pain she was experiencing has improved  She notes a continued throbbing pain  She has been working on motion  She states she feels as though her small finger is rubbing on her ring finger  She has been taking the Robaxin as needed         PHYSICAL EXAMINATION:  General: well developed and well nourished, alert, oriented times 3 and appears comfortable  Psychiatric: Normal    MUSCULOSKELETAL EXAMINATION:  Incision: Clean, dry, intact  Surgery Site: normal, no evidence of infection   Range of Motion: 15 shy full ext  PIP flexion 55  Pulp to palm 2 5 cm  Neurovascular status: Neuro intact, good cap refill         STUDIES REVIEWED:  No studies to review       PROCEDURES PERFORMED:  Procedures  No Procedures performed today    Scribe Attestation    I,:  Romi Pacheco MA am acting as a scribe while in the presence of the attending physician :       I,:  Beckie Azul MD personally performed the services described in this documentation    as scribed in my presence :

## 2024-02-16 ENCOUNTER — HOSPITAL ENCOUNTER (EMERGENCY)
Facility: HOSPITAL | Age: 29
Discharge: HOME | End: 2024-02-16
Attending: EMERGENCY MEDICINE
Payer: COMMERCIAL

## 2024-02-16 ENCOUNTER — APPOINTMENT (EMERGENCY)
Dept: CT IMAGING | Facility: HOSPITAL | Age: 29
End: 2024-02-16
Payer: COMMERCIAL

## 2024-02-16 ENCOUNTER — OFFICE VISIT (OUTPATIENT)
Dept: URGENT CARE | Age: 29
End: 2024-02-16

## 2024-02-16 VITALS
TEMPERATURE: 98.4 F | SYSTOLIC BLOOD PRESSURE: 121 MMHG | DIASTOLIC BLOOD PRESSURE: 97 MMHG | RESPIRATION RATE: 20 BRPM | HEART RATE: 92 BPM | OXYGEN SATURATION: 100 %

## 2024-02-16 VITALS
SYSTOLIC BLOOD PRESSURE: 113 MMHG | TEMPERATURE: 98.2 F | HEART RATE: 79 BPM | RESPIRATION RATE: 20 BRPM | OXYGEN SATURATION: 100 % | DIASTOLIC BLOOD PRESSURE: 81 MMHG

## 2024-02-16 DIAGNOSIS — N83.10 CORPUS LUTEUM CYST: Primary | ICD-10-CM

## 2024-02-16 DIAGNOSIS — R52 PAIN: ICD-10-CM

## 2024-02-16 DIAGNOSIS — R10.32 LEFT LOWER QUADRANT ABDOMINAL PAIN: Primary | ICD-10-CM

## 2024-02-16 LAB
ALBUMIN SERPL BCP-MCNC: 4.8 G/DL (ref 3.5–5)
ALP SERPL-CCNC: 44 U/L (ref 34–104)
ALT SERPL W P-5'-P-CCNC: 6 U/L (ref 7–52)
ANION GAP SERPL CALCULATED.3IONS-SCNC: 6 MMOL/L
AST SERPL W P-5'-P-CCNC: 11 U/L (ref 13–39)
BACTERIA UR QL AUTO: ABNORMAL /HPF
BASOPHILS # BLD AUTO: 0.03 THOUSANDS/ÂΜL (ref 0–0.1)
BASOPHILS NFR BLD AUTO: 0 % (ref 0–1)
BILIRUB SERPL-MCNC: 0.39 MG/DL (ref 0.2–1)
BILIRUB UR QL STRIP: NEGATIVE
BUN SERPL-MCNC: 10 MG/DL (ref 5–25)
CALCIUM SERPL-MCNC: 9.3 MG/DL (ref 8.4–10.2)
CHLORIDE SERPL-SCNC: 107 MMOL/L (ref 96–108)
CLARITY UR: CLEAR
CO2 SERPL-SCNC: 25 MMOL/L (ref 21–32)
COLOR UR: ABNORMAL
CREAT SERPL-MCNC: 0.77 MG/DL (ref 0.6–1.3)
EOSINOPHIL # BLD AUTO: 0.1 THOUSAND/ÂΜL (ref 0–0.61)
EOSINOPHIL NFR BLD AUTO: 1 % (ref 0–6)
ERYTHROCYTE [DISTWIDTH] IN BLOOD BY AUTOMATED COUNT: 12.1 % (ref 11.6–15.1)
EXT PREGNANCY TEST URINE: NEGATIVE
EXT. CONTROL: NORMAL
GFR SERPL CREATININE-BSD FRML MDRD: 105 ML/MIN/1.73SQ M
GLUCOSE SERPL-MCNC: 76 MG/DL (ref 65–140)
GLUCOSE UR STRIP-MCNC: NEGATIVE MG/DL
HCT VFR BLD AUTO: 39.3 % (ref 34.8–46.1)
HGB BLD-MCNC: 13.1 G/DL (ref 11.5–15.4)
HGB UR QL STRIP.AUTO: ABNORMAL
IMM GRANULOCYTES # BLD AUTO: 0.02 THOUSAND/UL (ref 0–0.2)
IMM GRANULOCYTES NFR BLD AUTO: 0 % (ref 0–2)
KETONES UR STRIP-MCNC: NEGATIVE MG/DL
LEUKOCYTE ESTERASE UR QL STRIP: ABNORMAL
LIPASE SERPL-CCNC: 21 U/L (ref 11–82)
LYMPHOCYTES # BLD AUTO: 3.32 THOUSANDS/ÂΜL (ref 0.6–4.47)
LYMPHOCYTES NFR BLD AUTO: 34 % (ref 14–44)
MCH RBC QN AUTO: 31.8 PG (ref 26.8–34.3)
MCHC RBC AUTO-ENTMCNC: 33.3 G/DL (ref 31.4–37.4)
MCV RBC AUTO: 95 FL (ref 82–98)
MONOCYTES # BLD AUTO: 0.81 THOUSAND/ÂΜL (ref 0.17–1.22)
MONOCYTES NFR BLD AUTO: 8 % (ref 4–12)
MUCOUS THREADS UR QL AUTO: ABNORMAL
NEUTROPHILS # BLD AUTO: 5.46 THOUSANDS/ÂΜL (ref 1.85–7.62)
NEUTS SEG NFR BLD AUTO: 57 % (ref 43–75)
NITRITE UR QL STRIP: NEGATIVE
NON-SQ EPI CELLS URNS QL MICRO: ABNORMAL /HPF
NRBC BLD AUTO-RTO: 0 /100 WBCS
PH UR STRIP.AUTO: 6 [PH]
PLATELET # BLD AUTO: 303 THOUSANDS/UL (ref 149–390)
PMV BLD AUTO: 8.8 FL (ref 8.9–12.7)
POTASSIUM SERPL-SCNC: 3.8 MMOL/L (ref 3.5–5.3)
PROT SERPL-MCNC: 7.4 G/DL (ref 6.4–8.4)
PROT UR STRIP-MCNC: NEGATIVE MG/DL
RBC # BLD AUTO: 4.12 MILLION/UL (ref 3.81–5.12)
RBC #/AREA URNS AUTO: ABNORMAL /HPF
SL AMB  POCT GLUCOSE, UA: ABNORMAL
SL AMB LEUKOCYTE ESTERASE,UA: ABNORMAL
SL AMB POCT BILIRUBIN,UA: ABNORMAL
SL AMB POCT BLOOD,UA: ABNORMAL
SL AMB POCT CLARITY,UA: ABNORMAL
SL AMB POCT COLOR,UA: ABNORMAL
SL AMB POCT KETONES,UA: ABNORMAL
SL AMB POCT NITRITE,UA: ABNORMAL
SL AMB POCT PH,UA: 6.5
SL AMB POCT SPECIFIC GRAVITY,UA: 1
SL AMB POCT URINE PROTEIN: ABNORMAL
SL AMB POCT UROBILINOGEN: 0.2
SODIUM SERPL-SCNC: 138 MMOL/L (ref 135–147)
SP GR UR STRIP.AUTO: 1.01 (ref 1–1.03)
UROBILINOGEN UR STRIP-ACNC: <2 MG/DL
WBC # BLD AUTO: 9.74 THOUSAND/UL (ref 4.31–10.16)
WBC #/AREA URNS AUTO: ABNORMAL /HPF

## 2024-02-16 PROCEDURE — 81001 URINALYSIS AUTO W/SCOPE: CPT | Performed by: EMERGENCY MEDICINE

## 2024-02-16 PROCEDURE — 80053 COMPREHEN METABOLIC PANEL: CPT | Performed by: EMERGENCY MEDICINE

## 2024-02-16 PROCEDURE — 99213 OFFICE O/P EST LOW 20 MIN: CPT

## 2024-02-16 PROCEDURE — G1004 CDSM NDSC: HCPCS

## 2024-02-16 PROCEDURE — 83690 ASSAY OF LIPASE: CPT | Performed by: EMERGENCY MEDICINE

## 2024-02-16 PROCEDURE — 81002 URINALYSIS NONAUTO W/O SCOPE: CPT

## 2024-02-16 PROCEDURE — 99285 EMERGENCY DEPT VISIT HI MDM: CPT | Performed by: EMERGENCY MEDICINE

## 2024-02-16 PROCEDURE — 81025 URINE PREGNANCY TEST: CPT | Performed by: EMERGENCY MEDICINE

## 2024-02-16 PROCEDURE — 87086 URINE CULTURE/COLONY COUNT: CPT

## 2024-02-16 PROCEDURE — 96361 HYDRATE IV INFUSION ADD-ON: CPT

## 2024-02-16 PROCEDURE — 74177 CT ABD & PELVIS W/CONTRAST: CPT

## 2024-02-16 PROCEDURE — 36415 COLL VENOUS BLD VENIPUNCTURE: CPT | Performed by: EMERGENCY MEDICINE

## 2024-02-16 PROCEDURE — 85025 COMPLETE CBC W/AUTO DIFF WBC: CPT | Performed by: EMERGENCY MEDICINE

## 2024-02-16 PROCEDURE — 96374 THER/PROPH/DIAG INJ IV PUSH: CPT

## 2024-02-16 PROCEDURE — 96375 TX/PRO/DX INJ NEW DRUG ADDON: CPT

## 2024-02-16 PROCEDURE — 99284 EMERGENCY DEPT VISIT MOD MDM: CPT

## 2024-02-16 RX ORDER — ONDANSETRON 2 MG/ML
4 INJECTION INTRAMUSCULAR; INTRAVENOUS ONCE
Status: COMPLETED | OUTPATIENT
Start: 2024-02-16 | End: 2024-02-16

## 2024-02-16 RX ORDER — MORPHINE SULFATE 10 MG/ML
6 INJECTION, SOLUTION INTRAMUSCULAR; INTRAVENOUS ONCE
Status: COMPLETED | OUTPATIENT
Start: 2024-02-16 | End: 2024-02-16

## 2024-02-16 RX ORDER — KETOROLAC TROMETHAMINE 30 MG/ML
15 INJECTION, SOLUTION INTRAMUSCULAR; INTRAVENOUS ONCE
Status: COMPLETED | OUTPATIENT
Start: 2024-02-16 | End: 2024-02-16

## 2024-02-16 RX ORDER — NAPROXEN 500 MG/1
500 TABLET ORAL 2 TIMES DAILY PRN
Qty: 20 TABLET | Refills: 0 | Status: SHIPPED | OUTPATIENT
Start: 2024-02-16

## 2024-02-16 RX ADMIN — IOHEXOL 85 ML: 350 INJECTION, SOLUTION INTRAVENOUS at 16:23

## 2024-02-16 RX ADMIN — ONDANSETRON 4 MG: 2 INJECTION INTRAMUSCULAR; INTRAVENOUS at 15:58

## 2024-02-16 RX ADMIN — MORPHINE SULFATE 6 MG: 10 INJECTION INTRAVENOUS at 16:49

## 2024-02-16 RX ADMIN — KETOROLAC TROMETHAMINE 15 MG: 30 INJECTION, SOLUTION INTRAMUSCULAR; INTRAVENOUS at 15:59

## 2024-02-16 RX ADMIN — SODIUM CHLORIDE 1000 ML: 0.9 INJECTION, SOLUTION INTRAVENOUS at 15:18

## 2024-02-16 NOTE — PROGRESS NOTES
[facility]        NAME: Zenia Dorado is a 28 y.o. female  : 1995    MRN: [id number]  DATE: 2024  TIME: 2:23 PM    Assessment and Plan   Left lower quadrant abdominal pain [R10.32]  1. Left lower quadrant abdominal pain  Transfer to other facility      2. Pain  POCT urine dip    Urine culture        Sharp stabbing LLQ abd pain into left flank, tender to palpation 2 weeks, becoming worse. Denies urinary symptoms. History of Nephrolithiasis, Hydronephrosis, Medullary sponge kidney, no recent kidney stone symptoms. Urine positive for trace nonhemolyzed blood, and leukocytes. Urine sent for culture. Pt requires further evaluation including US and blood work which is unable to be completed in this setting. Pt will go to [facility] ED.     Patient Instructions       Follow up with PCP in 3-5 days.  Proceed to  ER if symptoms worsen.    Chief Complaint     Chief Complaint   Patient presents with   • Abdominal Pain     LLQ ABDOMEN PAIN FOR 3 WEEKS AND IT'S GETTING WORSE.         History of Present Illness       Patient is a 28-year-old female presenting with 3 weeks of left lower quadrant abdominal pain, change started after her last period.  Patient states that the pain starts in her left lower quadrant and radiates into her left back.  Patient denies urinary symptoms, has regular bowel movements, denies nausea vomiting or diarrhea.  States that the pain is sharp in nature and wakes her at night with diffuse abdominal cramping which is 10 out of 10.  Patient reports history of kidney stones however this does not feel like 1 as the pain is radiating in the opposite direction.  Patient states she has a nephrologist but has not followed up in approximately 3 years.  Patient denies taking anything for symptoms.    Abdominal Pain  This is a new problem. The current episode started 1 to 4 weeks ago. The onset quality is gradual. The problem occurs constantly. The problem has been  gradually worsening. The pain is located in the left flank. The pain is at a severity of 8/10. The quality of the pain is cramping, sharp and tearing. The abdominal pain radiates to the pelvis. Associated symptoms include anorexia, headaches, myalgias and nausea. Pertinent negatives include no arthralgias, belching, constipation, diarrhea, dysuria, fever, flatus, frequency, hematochezia, hematuria, melena, vomiting or weight loss. The pain is aggravated by certain positions and movement. The pain is relieved by Nothing.       Review of Systems   Review of Systems   Constitutional:  Negative for fever and weight loss.   Gastrointestinal:  Positive for abdominal pain, anorexia and nausea. Negative for constipation, diarrhea, flatus, hematochezia, melena and vomiting.   Genitourinary:  Negative for dysuria, frequency and hematuria.   Musculoskeletal:  Positive for myalgias. Negative for arthralgias.   Neurological:  Positive for headaches.         Current Medications       Current Outpatient Medications:   •  acetaminophen (TYLENOL) 500 mg tablet, Take 500 mg by mouth every 6 (six) hours as needed for mild pain, Disp: , Rfl:   •  ibuprofen (MOTRIN) 400 mg tablet, Take by mouth every 6 (six) hours as needed for mild pain, Disp: , Rfl:   •  lidocaine (XYLOCAINE) 2 % topical gel, Apply topically as needed for mild pain, Disp: 30 mL, Rfl: 2  •  methocarbamol (ROBAXIN) 500 mg tablet, Take 1 tablet (500 mg total) by mouth 2 (two) times a day as needed for muscle spasms, Disp: 20 tablet, Rfl: 0  •  naproxen (NAPROSYN) 375 mg tablet, Take 375 mg by mouth 2 (two) times a day with meals, Disp: , Rfl:   •  oxyCODONE (Roxicodone) 5 immediate release tablet, Take 1 tablet (5 mg total) by mouth every 8 (eight) hours as needed for severe pain Max Daily Amount: 15 mg (Patient not taking: Reported on 4/25/2023), Disp: 5 tablet, Rfl: 0    Current Allergies     Allergies as of 02/16/2024   • (No Known Allergies)            The following  portions of the patient's history were reviewed and updated as appropriate: allergies, current medications, past family history, past medical history, past social history, past surgical history and problem list.     Past Medical History:   Diagnosis Date   • COVID 01/2021   • Kidney stones     started w/ kidney stones 1.5 years ago approx.       Past Surgical History:   Procedure Laterality Date   • MA LITHOTRIPSY XTRCORP SHOCK WAVE Left 10/11/2016    Procedure: ESWL ;  Surgeon: Rolly Horton MD;  Location[facility];  Service: Urology   • MA OPEN TX PHALANGEAL SHAFT FRACTURE PROX/MIDDLE EA Left 3/9/2023    Procedure: CLOSED REDUCTION PERCUTANEOUS PINNING  LEFT SMALL  FINGER PROXIMAL PHALANX;  Surgeon: Fernando Bernal MD;  Location: [facility];  Service: Orthopedics   • WISDOM TOOTH EXTRACTION         Family History   Problem Relation Age of Onset   • Nephrolithiasis Mother          Medications have been verified.        Objective   /97   Pulse 92   Temp 98.4 °F (36.9 °C) (Temporal)   Resp 20   SpO2 100%   No LMP recorded.       Physical Exam     Physical Exam  Vitals and nursing note reviewed.   Constitutional:       General: She is not in acute distress.     Appearance: She is well-developed and normal weight. She is ill-appearing.   Cardiovascular:      Rate and Rhythm: Normal rate and regular rhythm.   Abdominal:      General: Abdomen is flat. There is no distension.      Palpations: Abdomen is soft.      Tenderness: There is abdominal tenderness in the left lower quadrant. There is no right CVA tenderness or left CVA tenderness. Negative signs include Mcgrath's sign, Rovsing's sign, McBurney's sign and obturator sign.   Neurological:      Mental Status: She is alert.

## 2024-02-16 NOTE — PATIENT INSTRUCTIONS
Left Lower Quadrant Abdominal Pain.    Go to the ER for further evaluation including blood work and imaging.

## 2024-02-16 NOTE — ED PROVIDER NOTES
History  Chief Complaint   Patient presents with    Abdominal Pain     Pt presents with left sided abd pain that radiates to her back x1 week. +n, denies v/d. Denies urinary symptoms and fevers.            28-year-old female, left lower quadrant abdominal pain and tenderness.  History of nephrolithiasis states this feels similar but more intense and more located left lower quadrant more in the past it has been more located in the left flank.  Pain does radiate to the left flank.  Tender over left CVA and left lower quadrant seen urgent care and referred to ED.  Also has a history of ovarian cysts.  No fevers no chills no dysuria.  No falls no injury no trauma.      Abdominal Pain  Associated symptoms: no chest pain, no chills, no constipation, no cough, no diarrhea, no dysuria, no fever, no nausea, no shortness of breath, no sore throat and no vomiting        Prior to Admission Medications   Prescriptions Last Dose Informant Patient Reported? Taking?   acetaminophen (TYLENOL) 500 mg tablet   Yes No   Sig: Take 500 mg by mouth every 6 (six) hours as needed for mild pain   ibuprofen (MOTRIN) 400 mg tablet   Yes No   Sig: Take by mouth every 6 (six) hours as needed for mild pain   lidocaine (XYLOCAINE) 2 % topical gel   No No   Sig: Apply topically as needed for mild pain   methocarbamol (ROBAXIN) 500 mg tablet   No No   Sig: Take 1 tablet (500 mg total) by mouth 2 (two) times a day as needed for muscle spasms   naproxen (NAPROSYN) 375 mg tablet   Yes No   Sig: Take 375 mg by mouth 2 (two) times a day with meals   oxyCODONE (Roxicodone) 5 immediate release tablet   No No   Sig: Take 1 tablet (5 mg total) by mouth every 8 (eight) hours as needed for severe pain Max Daily Amount: 15 mg   Patient not taking: Reported on 4/25/2023      Facility-Administered Medications: None       Past Medical History:   Diagnosis Date    COVID 01/2021    Kidney stones     started w/ kidney stones 1.5 years ago approx.       Past Surgical  History:   Procedure Laterality Date    MS LITHOTRIPSY XTRCORP SHOCK WAVE Left 10/11/2016    Procedure: ESWL ;  Surgeon: Rolly Horton MD;  [facility];  Service: Urology    MS OPEN TX PHALANGEAL SHAFT FRACTURE PROX/MIDDLE EA Left 3/9/2023    Procedure: CLOSED REDUCTION PERCUTANEOUS PINNING  LEFT SMALL  FINGER PROXIMAL PHALANX;  Surgeon: Fernando Bernal MD;  [facility] MA[facility];  Service: Orthopedics    WISDOM TOOTH EXTRACTION         Family History   Problem Relation Age of Onset    Nephrolithiasis Mother      I have reviewed and agree with the history as documented.    E-Cigarette/Vaping    E-Cigarette Use Never User      E-Cigarette/Vaping Substances     Social History     Tobacco Use    Smoking status: Former    Smokeless tobacco: Never   Vaping Use    Vaping status: Never Used   Substance Use Topics    Alcohol use: Not Currently     Comment: socially    Drug use: No       Review of Systems   Constitutional:  Negative for activity change, chills, diaphoresis and fever.   HENT:  Negative for congestion, sinus pressure and sore throat.    Eyes:  Negative for pain and visual disturbance.   Respiratory:  Negative for cough, chest tightness, shortness of breath, wheezing and stridor.    Cardiovascular:  Negative for chest pain and palpitations.   Gastrointestinal:  Positive for abdominal pain. Negative for abdominal distention, constipation, diarrhea, nausea and vomiting.   Genitourinary:  Positive for flank pain. Negative for dysuria and frequency.   Musculoskeletal:  Negative for neck pain and neck stiffness.   Skin:  Negative for rash.   Neurological:  Negative for dizziness, speech difficulty, light-headedness, numbness and headaches.       Physical Exam  Physical Exam  Vitals reviewed.   Constitutional:       General: She is not in acute distress.     Appearance: She is well-developed. She is not diaphoretic.   HENT:      Head: Normocephalic and atraumatic.      Right Ear: External ear  normal.      Left Ear: External ear normal.      Nose: Nose normal.   Eyes:      General:         Right eye: No discharge.         Left eye: No discharge.      Pupils: Pupils are equal, round, and reactive to light.   Neck:      Trachea: No tracheal deviation.   Cardiovascular:      Rate and Rhythm: Normal rate and regular rhythm.      Heart sounds: Normal heart sounds. No murmur heard.  Pulmonary:      Effort: Pulmonary effort is normal. No respiratory distress.      Breath sounds: Normal breath sounds. No stridor.   Abdominal:      General: There is no distension.      Palpations: Abdomen is soft.      Tenderness: There is abdominal tenderness in the left lower quadrant. There is left CVA tenderness. There is no guarding or rebound.   Musculoskeletal:         General: Normal range of motion.      Cervical back: Normal range of motion and neck supple.   Skin:     General: Skin is warm and dry.      Coloration: Skin is not pale.      Findings: No erythema.   Neurological:      General: No focal deficit present.      Mental Status: She is alert and oriented to person, place, and time.         Vital Signs  ED Triage Vitals [02/16/24 1446]   Temperature Pulse Respirations Blood Pressure SpO2   98.2 °F (36.8 °C) 96 20 139/86 100 %      Temp Source Heart Rate Source Patient Position - Orthostatic VS BP Location FiO2 (%)   Oral Monitor Sitting Right arm --      Pain Score       8           Vitals:    02/16/24 1446 02/16/24 1633 02/16/24 1730   BP: 139/86 122/79 113/81   Pulse: 96 91 79   Patient Position - Orthostatic VS: Sitting Sitting          Visual Acuity      ED Medications  Medications   sodium chloride 0.9 % bolus 1,000 mL (0 mL Intravenous Stopped 2/16/24 1729)   ondansetron (ZOFRAN) injection 4 mg (4 mg Intravenous Given 2/16/24 1558)   ketorolac (TORADOL) injection 15 mg (15 mg Intravenous Given 2/16/24 1559)   iohexol (OMNIPAQUE) 350 MG/ML injection (MULTI-DOSE) 85 mL (85 mL Intravenous Given 2/16/24 1623)    morphine injection 6 mg (6 mg Intravenous Given 2/16/24 1649)       Diagnostic Studies  Results Reviewed       Procedure Component Value Units Date/Time    Comprehensive metabolic panel [[id number]]  (Abnormal) Collected: 02/16/24 1515    Lab Status: Final result Specimen: Blood from Arm, Right Updated: 02/16/24 1542     Sodium 138 mmol/L      Potassium 3.8 mmol/L      Chloride 107 mmol/L      CO2 25 mmol/L      ANION GAP 6 mmol/L      BUN 10 mg/dL      Creatinine 0.77 mg/dL      Glucose 76 mg/dL      Calcium 9.3 mg/dL      AST 11 U/L      ALT 6 U/L      Alkaline Phosphatase 44 U/L      Total Protein 7.4 g/dL      Albumin 4.8 g/dL      Total Bilirubin 0.39 mg/dL      eGFR 105 ml/min/1.73sq m     Narrative:      National Kidney Disease Foundation guidelines for Chronic Kidney Disease (CKD):     Stage 1 with normal or high GFR (GFR > 90 mL/min/1.73 square meters)    Stage 2 Mild CKD (GFR = 60-89 mL/min/1.73 square meters)    Stage 3A Moderate CKD (GFR = 45-59 mL/min/1.73 square meters)    Stage 3B Moderate CKD (GFR = 30-44 mL/min/1.73 square meters)    Stage 4 Severe CKD (GFR = 15-29 mL/min/1.73 square meters)    Stage 5 End Stage CKD (GFR <15 mL/min/1.73 square meters)  Note: GFR calculation is accurate only with a steady state creatinine    Lipase [[id number]]  (Normal) Collected: 02/16/24 1515    Lab Status: Final result Specimen: Blood from Arm, Right Updated: 02/16/24 1542     Lipase 21 u/L     Urine Microscopic [[id number]]  (Abnormal) Collected: 02/16/24 1515    Lab Status: Final result Specimen: Urine, Clean Catch Updated: 02/16/24 1538     RBC, UA 2-4 /hpf      WBC, UA 1-2 /hpf      Epithelial Cells Occasional /hpf      Bacteria, UA None Seen /hpf      MUCUS THREADS Occasional    UA w Reflex to Microscopic w Reflex to Culture [[id number]]  (Abnormal) Collected: 02/16/24 1515    Lab Status: Final result Specimen: Urine, Clean Catch Updated: 02/16/24 1532     Color, UA Light Yellow     Clarity, UA Clear      Specific Gravity, UA 1.014     pH, UA 6.0     Leukocytes, UA Trace     Nitrite, UA Negative     Protein, UA Negative mg/dl      Glucose, UA Negative mg/dl      Ketones, UA Negative mg/dl      Urobilinogen, UA <2.0 mg/dl      Bilirubin, UA Negative     Occult Blood, UA Small    CBC and differential [[id number]]  (Abnormal) Collected: 02/16/24 1515    Lab Status: Final result Specimen: Blood from Arm, Right Updated: 02/16/24 1528     WBC 9.74 Thousand/uL      RBC 4.12 Million/uL      Hemoglobin 13.1 g/dL      Hematocrit 39.3 %      MCV 95 fL      MCH 31.8 pg      MCHC 33.3 g/dL      RDW 12.1 %      MPV 8.8 fL      Platelets 303 Thousands/uL      nRBC 0 /100 WBCs      Neutrophils Relative 57 %      Immat GRANS % 0 %      Lymphocytes Relative 34 %      Monocytes Relative 8 %      Eosinophils Relative 1 %      Basophils Relative 0 %      Neutrophils Absolute 5.46 Thousands/µL      Immature Grans Absolute 0.02 Thousand/uL      Lymphocytes Absolute 3.32 Thousands/µL      Monocytes Absolute 0.81 Thousand/µL      Eosinophils Absolute 0.10 Thousand/µL      Basophils Absolute 0.03 Thousands/µL     POCT pregnancy, urine [[id number]]  (Normal) Resulted: 02/16/24 1516    Lab Status: Final result Updated: 02/16/24 1516     EXT Preg Test, Ur Negative     Control Valid                   CT abdomen pelvis with contrast   Final Result by Sushila Escamilla MD (02/16 1739)      1) Small amount of free fluid in the dependent portion of the pelvis, possibly related to the corpus luteal cyst in the right ovary.      2) No other acute abdominal or pelvic pathology.      3) Punctate bilateral intrarenal calculi, however no ureteral or urinary bladder calculi. No hydronephrosis or perinephric fat stranding. No evidence of pyelonephritis.      4) No bowel obstruction.  Evaluation for bowel inflammation somewhat limited by underdistention and lack of oral contrast, however no convincing inflammatory changes.  Please note mild inflammation may not  be apparent.      5) Additional findings as above.                  Workstation performed: [id number]                    Procedures  Procedures         ED Course  ED Course as of 02/16/24 2226   Fri Feb 16, 2024   1725 UA and microscopy without blaring evidence of infection, will not treat as UTI                                             Medical Decision Making        Initial ED assessment:     28-year-old female, left lower quadrant abdominal pain and tenderness left CVA tenderness history of nephrolithiasis     Initial DDx includes but is not limited to:   nephrolithiasis, infected nephrolithiasis, ovarian cyst, ovarian torsion,Diverticulitis, colitis     Initial ED plan:    Blood work, CT scan, possibly ultrasound, ultimate disposition pending ED workup        Final ED summary/disposition:   After evaluation and workup in the emergency department, workup showing corpus luteal cyst with fluid in the pelvis possibly cause the patient symptoms.,  Will discharge    Amount and/or Complexity of Data Reviewed  Labs: ordered.  Radiology: ordered.    Risk  Prescription drug management.             Disposition  Final diagnoses:   Corpus luteum cyst     Time reflects when diagnosis was documented in both MDM as applicable and the Disposition within this note       Time User Action Codes Description Comment    2/16/2024  6:01 PM Robby Nayak Add [N83.202] Cyst of left ovary     2/16/2024  6:01 PM Robby Nayak Add [N83.10] Corpus luteum cyst     2/16/2024  6:01 PM Robby Nayak Modify [N83.10] Corpus luteum cyst     2/16/2024  6:01 PM Robby Nayak Remove [N83.202] Cyst of left ovary           ED Disposition       ED Disposition   Discharge    Condition   Stable    Date/Time   Fri Feb 16, 2024  6:01 PM    Comment   Zenia Dorado discharge to home/self care.                   Follow-up Information    None         Discharge Medication List as of 2/16/2024  6:02 PM        START taking these medications     Details   !! naproxen (NAPROSYN) 500 mg tablet Take 1 tablet (500 mg total) by mouth 2 (two) times a day as needed for mild pain, Starting Fri 2/16/2024, Normal       !! - Potential duplicate medications found. Please discuss with provider.        CONTINUE these medications which have NOT CHANGED    Details   acetaminophen (TYLENOL) 500 mg tablet Take 500 mg by mouth every 6 (six) hours as needed for mild pain, Historical Med      ibuprofen (MOTRIN) 400 mg tablet Take by mouth every 6 (six) hours as needed for mild pain, Historical Med      lidocaine (XYLOCAINE) 2 % topical gel Apply topically as needed for mild pain, Starting Fri 4/21/2023, Normal      methocarbamol (ROBAXIN) 500 mg tablet Take 1 tablet (500 mg total) by mouth 2 (two) times a day as needed for muscle spasms, Starting Fri 4/21/2023, Normal      !! naproxen (NAPROSYN) 375 mg tablet Take 375 mg by mouth 2 (two) times a day with meals, Historical Med      oxyCODONE (Roxicodone) 5 immediate release tablet Take 1 tablet (5 mg total) by mouth every 8 (eight) hours as needed for severe pain Max Daily Amount: 15 mg, Starting Thu 4/20/2023, Normal       !! - Potential duplicate medications found. Please discuss with provider.          No discharge procedures on file.    PDMP Review         Value Time User    PDMP Reviewed  Yes 4/20/2023  9:49 AM Kari Kramer PA-C            ED Provider  Electronically Signed by             Robby Nayak DO  02/16/24 4293

## 2024-02-17 LAB — BACTERIA UR CULT: NORMAL

## 2024-04-03 ENCOUNTER — APPOINTMENT (OUTPATIENT)
Dept: URGENT CARE | Facility: MEDICAL CENTER | Age: 29
End: 2024-04-03

## 2024-04-03 ENCOUNTER — APPOINTMENT (OUTPATIENT)
Dept: LAB | Facility: MEDICAL CENTER | Age: 29
End: 2024-04-03

## 2024-04-03 DIAGNOSIS — Z02.1 PHYSICAL EXAM, PRE-EMPLOYMENT: ICD-10-CM

## 2024-04-03 PROCEDURE — 86480 TB TEST CELL IMMUN MEASURE: CPT

## 2024-04-04 LAB
GAMMA INTERFERON BACKGROUND BLD IA-ACNC: 0.04 IU/ML
M TB IFN-G BLD-IMP: NEGATIVE
M TB IFN-G CD4+ BCKGRND COR BLD-ACNC: 0.02 IU/ML
M TB IFN-G CD4+ BCKGRND COR BLD-ACNC: 0.08 IU/ML
MITOGEN IGNF BCKGRD COR BLD-ACNC: 9.96 IU/ML

## 2024-05-10 DIAGNOSIS — Z00.6 ENCOUNTER FOR EXAMINATION FOR NORMAL COMPARISON OR CONTROL IN CLINICAL RESEARCH PROGRAM: ICD-10-CM

## 2024-09-25 ENCOUNTER — OFFICE VISIT (OUTPATIENT)
Dept: OBGYN CLINIC | Facility: CLINIC | Age: 29
End: 2024-09-25
Payer: COMMERCIAL

## 2024-09-25 VITALS
HEIGHT: 62 IN | BODY MASS INDEX: 24.66 KG/M2 | WEIGHT: 134 LBS | DIASTOLIC BLOOD PRESSURE: 86 MMHG | SYSTOLIC BLOOD PRESSURE: 122 MMHG

## 2024-09-25 DIAGNOSIS — B37.9 YEAST INFECTION: ICD-10-CM

## 2024-09-25 DIAGNOSIS — N92.3 INTERMENSTRUAL BLEEDING: ICD-10-CM

## 2024-09-25 DIAGNOSIS — Z87.42 HISTORY OF OVARIAN CYST: ICD-10-CM

## 2024-09-25 DIAGNOSIS — R10.2 PELVIC PAIN: Primary | ICD-10-CM

## 2024-09-25 PROCEDURE — 87563 M. GENITALIUM AMP PROBE: CPT | Performed by: OBSTETRICS & GYNECOLOGY

## 2024-09-25 PROCEDURE — 87624 HPV HI-RISK TYP POOLED RSLT: CPT | Performed by: OBSTETRICS & GYNECOLOGY

## 2024-09-25 PROCEDURE — G0145 SCR C/V CYTO,THINLAYER,RESCR: HCPCS | Performed by: STUDENT IN AN ORGANIZED HEALTH CARE EDUCATION/TRAINING PROGRAM

## 2024-09-25 PROCEDURE — 87591 N.GONORRHOEAE DNA AMP PROB: CPT | Performed by: OBSTETRICS & GYNECOLOGY

## 2024-09-25 PROCEDURE — 81514 NFCT DS BV&VAGINITIS DNA ALG: CPT | Performed by: OBSTETRICS & GYNECOLOGY

## 2024-09-25 PROCEDURE — G0124 SCREEN C/V THIN LAYER BY MD: HCPCS | Performed by: STUDENT IN AN ORGANIZED HEALTH CARE EDUCATION/TRAINING PROGRAM

## 2024-09-25 PROCEDURE — 87491 CHLMYD TRACH DNA AMP PROBE: CPT | Performed by: OBSTETRICS & GYNECOLOGY

## 2024-09-25 PROCEDURE — 99204 OFFICE O/P NEW MOD 45 MIN: CPT | Performed by: OBSTETRICS & GYNECOLOGY

## 2024-09-25 RX ORDER — FLUCONAZOLE 150 MG/1
150 TABLET ORAL EVERY OTHER DAY
Qty: 2 TABLET | Refills: 0 | Status: SHIPPED | OUTPATIENT
Start: 2024-09-25 | End: 2024-09-28

## 2024-09-25 NOTE — PROGRESS NOTES
"Subjective     Zenia Dorado is a 29 y.o. female here with multiple concerns: Hx ovarian cysts, intermittent pelvic pains & radiating pain from pelvis/ vagina to legs, insertional pain with intercourse, and intermenstrual spotting.     She has a history of ovarian cysts. She was seen in 2015 in the ED and found to have an ovarian cyst around the size of an orange. She contemplated surgery but actually completed a homeopathic remedy which resulted in resolution of the cyst in 1 month. She was seen in the ED in 2/2024 due to abdominal pain and found to have a ruptured CL cyst. She reports that she experienced similar \"cyst rupture\" symptoms again in July as well. She initially wanted to be seen after her ED visit in Feb but did not have health insurance at the time.     She reports a lot of intermittent pelvic pain that seems to favor the left side. She has also been experiencing bloating which happens occasionally. She has always had painful periods but the new symptoms are more than just cramps.     She also reports feeling a pulling or pressure sensation from her vagina to her leg. She pain is mostly on the left side but can be either side. She reports zingers or electric shocks that go down the back of her legs. This just started this year and she has tried to find correlation with menses but it happens randomly.     She was on birth control for about 5 years but then stopped getting periods and she did not like that. She reports that she feels like her periods are a release and she became worried about infertility while on the OCPs.     She is sexually active with her boyfriend. They have been together for 5 years. She reports some insertional pain with intercourse followed by vulvovaginal swelling. She reports that they have tried lube to help.     Periods are regular but now she is bleeding 2-3 days (rather than 5+). She is however getting spotting between her periods - not heavy bleeding. She uses tampons " "on her period but she reports pain from having a tampon in so then she uses \"diaper pads\". She reports that a tampon can stay in for 3-4 hours prior to being soaked but she cannot leave it in that long due to pain. She is not currently planning for pregnancy but would like to have children one day. She reports a history of recurrent yeast infections which she reports were generally incidentally found on exams and she was treated and then they would return. She denies any symptoms of discharge, itching or burning. Her last pap test was > 5 years ago.    Obstetric History  OB History    Para Term  AB Living   0 0 0 0 0 0   SAB IAB Ectopic Multiple Live Births   0 0 0 0 0       Patient Active Problem List   Diagnosis    Nephrolithiasis    Hydronephrosis    Medullary sponge kidney     Past Medical History:   Diagnosis Date    COVID 2021    Kidney stone 2014    Kidney stones     started w/ kidney stones 1.5 years ago approx.     Past Surgical History:   Procedure Laterality Date    PA LITHOTRIPSY XTRCORP SHOCK WAVE Left 10/11/2016    Procedure: ESWL ;  Surgeon: Rolly Horton MD;  [facility];  Service: Urology    PA OPEN TX PHALANGEAL SHAFT FRACTURE PROX/MIDDLE EA Left 3/9/2023    Procedure: CLOSED REDUCTION PERCUTANEOUS PINNING  LEFT SMALL  FINGER PROXIMAL PHALANX;  Surgeon: Fernando Bernal MD;  Location: [facility] MAIN [facility];  Service: Orthopedics    WISDOM TOOTH EXTRACTION         Objective:    Vitals: Blood pressure 122/86, height 5' 2\" (1.575 m), weight 60.8 kg (134 lb), last menstrual period 2024.Body mass index is 24.51 kg/m².    Physical Exam  Vitals reviewed.   Constitutional:       General: She is not in acute distress.     Appearance: Normal appearance. She is well-developed. She is not ill-appearing, toxic-appearing or diaphoretic.   Cardiovascular:      Rate and Rhythm: Normal rate.   Pulmonary:      Effort: Pulmonary effort is normal. No respiratory distress. "   Genitourinary:     General: Normal vulva.      Exam position: Lithotomy position.      Labia:         Right: No rash, tenderness, lesion or injury.         Left: No rash, tenderness, lesion or injury.       Vagina: No signs of injury and foreign body. Vaginal discharge and erythema present. No tenderness, bleeding, lesions or prolapsed vaginal walls.      Cervix: Discharge present. No cervical motion tenderness, friability, lesion, erythema, cervical bleeding or eversion.      Uterus: Not deviated, not enlarged, not fixed, not tender (Pressure reported with bimanual exam) and no uterine prolapse.       Adnexa:         Right: Tenderness present. No mass or fullness.          Left: Tenderness (Mild with deep palpation; not the sharp pelvic pain she has been experiencing) present. No mass or fullness.        Comments: White discharge noted at the introitus - chunky, thick  Yeast clinging to the vaginal side walls and cervix  Skin:     General: Skin is warm and dry.   Neurological:      Mental Status: She is alert and oriented to person, place, and time.   Psychiatric:         Mood and Affect: Mood normal.         Behavior: Behavior normal.         Assessment & Plan  Pelvic pain  We reviewed the possible causes of pelvic pain including: MSK, urinary, GI, psychological and GYN related issues. GYN related issues may be hormonal, ovarian (anatomic), uterine (anatomic), menstrual, sexual, or pregnancy related. Examples including menstrual cramps, adenomyosis, adnexal masses, PID, chronic endometritis, pelvic congestion syndrome, endometriosis, fibroids, pelvic adhesive disease, vestibulitis, vulvodynia, etc.     Evaluation:   Exam: Unremarkable with the exception of yeast infection and mild adnexal tenderness with deep palpation. No uterosacral nodulatiry noted  Imaging: US ordered  Labs: Ordered  Biopsy: Will consider pending other results  Referrals:   GI: TBD  Urology: TBD  Urogynecology: TBD  Dx laparoscopy:  TBD    Treatment: Pending most likely cause. Given multiple complaints will likely need a combination of treatments  Hormonal therapy: Likely to be recommended given intermenstrual spotting and history of ovarian cysts but TBD  Neuropathic pain medication: Consider SNRA, TCA, Gabapentin/ Pregabalin  Procedural treatments: Consider Trigger point injections  Complementary medicine: Mind-body (Yoga, relatzation, sharri chi), manipulation (massage, chiropractic care, OMM), and bioenergetic (acupuncture) etc.   Surgical management: TBD  Physical therapy: Would likely benefit from PT given concern for MSK/ nerve related pain from the vulva/ pelvis to the legs     Orders:    US pelvis complete w transvaginal; Future    Liquid-based pap, screening    Chlamydia/GC amplified DNA by PCR    Trichomonas vaginalis/Mycoplasma genitalium PCR    Molecular Vaginal Panel    Intermenstrual bleeding  We reviewed the AUB workup and treatment options:  Workup:   - Exam: Remarkable for yeast infection  - Lab work: Ordered  - Pap: Collected  - EMB: Pending US and further bleeding between cycles  - US: ordered  Treatment options: Discussed that medical management with hormones may be recommended due to her pelvic pain, history of ovarian cysts, and intermenstrual spotting pending remainder of workup.     Orders:    TSH, 3rd generation with Free T4 reflex; Future    Prolactin; Future    Testosterone; Future    US pelvis complete w transvaginal; Future    Hemoglobin A1c (w/out EAG); Future    Liquid-based pap, screening    Chlamydia/GC amplified DNA by PCR    Trichomonas vaginalis/Mycoplasma genitalium PCR    Molecular Vaginal Panel    History of ovarian cyst  Ultrasound ordered for further assessment  Likely etiology of at least some of her pelvic pain  Recommend hormonal control to decrease risk of ovulatory cysts    Orders:    US pelvis complete w transvaginal; Future    Liquid-based pap, screening    Chlamydia/GC amplified DNA by PCR     Trichomonas vaginalis/Mycoplasma genitalium PCR    Molecular Vaginal Panel    Yeast infection  Exam diagnostic for vaginal candidiasis  Diflucan x2 prescribed    Insertional pain and swelling after intercourse is likely secondary to florid untreated yeast infection. Diflucan prescribed. Will retest at follow up appointment and if unresolved or recurrent, suppression therapy will be recommended. A1C ordered.     Orders:    fluconazole (DIFLUCAN) 150 mg tablet; Take 1 tablet (150 mg total) by mouth every other day for 2 doses    Hemoglobin A1c (w/out EAG); Future    Liquid-based pap, screening    Chlamydia/GC amplified DNA by PCR    Trichomonas vaginalis/Mycoplasma genitalium PCR    Molecular Vaginal Panel        Citlaly Muhammad MD  OB/GYN  She/her  9/25/2024  12:43 PM

## 2024-09-26 DIAGNOSIS — N76.0 BACTERIAL VAGINITIS: Primary | ICD-10-CM

## 2024-09-26 DIAGNOSIS — B96.89 BACTERIAL VAGINITIS: Primary | ICD-10-CM

## 2024-09-26 LAB
C GLABRATA DNA VAG QL NAA+PROBE: NEGATIVE
C KRUSEI DNA VAG QL NAA+PROBE: NEGATIVE
CANDIDA SP 6 PNL VAG NAA+PROBE: POSITIVE
M GENITALIUM DNA SPEC QL NAA+PROBE: NEGATIVE
T VAGINALIS DNA SPEC QL NAA+PROBE: NEGATIVE
T VAGINALIS DNA VAG QL NAA+PROBE: NEGATIVE
VAGINOSIS/ITIS DNA PNL VAG PROBE+SIG AMP: POSITIVE

## 2024-09-26 RX ORDER — METRONIDAZOLE 500 MG/1
500 TABLET ORAL EVERY 12 HOURS SCHEDULED
Qty: 14 TABLET | Refills: 0 | Status: SHIPPED | OUTPATIENT
Start: 2024-09-26 | End: 2024-10-03

## 2024-09-26 NOTE — RESULT ENCOUNTER NOTE
BV on culture; Flagyl sent to pharmacy  Yeast on culture; Diflucan x2 sent yesterday to pharmacy; Planning re-screen at F/U appt given history of recurrent yeast infections

## 2024-09-27 LAB
C TRACH DNA SPEC QL NAA+PROBE: NEGATIVE
N GONORRHOEA DNA SPEC QL NAA+PROBE: NEGATIVE

## 2024-09-29 ENCOUNTER — HOSPITAL ENCOUNTER (OUTPATIENT)
Dept: ULTRASOUND IMAGING | Facility: HOSPITAL | Age: 29
Discharge: HOME | End: 2024-09-29
Attending: OBSTETRICS & GYNECOLOGY
Payer: COMMERCIAL

## 2024-09-29 DIAGNOSIS — N92.3 INTERMENSTRUAL BLEEDING: ICD-10-CM

## 2024-09-29 DIAGNOSIS — R10.2 PELVIC PAIN: ICD-10-CM

## 2024-09-29 DIAGNOSIS — Z87.42 HISTORY OF OVARIAN CYST: ICD-10-CM

## 2024-09-29 PROCEDURE — 76856 US EXAM PELVIC COMPLETE: CPT

## 2024-09-29 PROCEDURE — 76830 TRANSVAGINAL US NON-OB: CPT

## 2024-10-01 LAB
HPV HR 12 DNA CVX QL NAA+PROBE: POSITIVE
HPV16 DNA CVX QL NAA+PROBE: NEGATIVE
HPV18 DNA CVX QL NAA+PROBE: NEGATIVE
LAB AP GYN PRIMARY INTERPRETATION: ABNORMAL
Lab: ABNORMAL
PATH INTERP SPEC-IMP: ABNORMAL

## 2024-10-02 ENCOUNTER — TELEPHONE (OUTPATIENT)
Dept: OBGYN CLINIC | Facility: CLINIC | Age: 29
End: 2024-10-02

## 2024-10-02 NOTE — RESULT ENCOUNTER NOTE
Normal pelvic US  1.5cm anterior fundal fibroid  Left SHUBHAM Muhammad MD  OB/GYN  She/her  10/2/2024  9:52 AM

## 2024-10-07 ENCOUNTER — APPOINTMENT (OUTPATIENT)
Dept: LAB | Facility: CLINIC | Age: 29
End: 2024-10-07
Payer: COMMERCIAL

## 2024-10-07 DIAGNOSIS — Z00.6 ENCOUNTER FOR EXAMINATION FOR NORMAL COMPARISON OR CONTROL IN CLINICAL RESEARCH PROGRAM: ICD-10-CM

## 2024-10-07 DIAGNOSIS — N92.3 INTERMENSTRUAL BLEEDING: ICD-10-CM

## 2024-10-07 DIAGNOSIS — B37.9 YEAST INFECTION: ICD-10-CM

## 2024-10-07 LAB
EST. AVERAGE GLUCOSE BLD GHB EST-MCNC: 97 MG/DL
HBA1C MFR BLD: 5 %
PROLACTIN SERPL-MCNC: 20.14 NG/ML (ref 3.34–26.72)
TESTOST SERPL-MSCNC: 38 NG/DL
TSH SERPL DL<=0.05 MIU/L-ACNC: 1.95 UIU/ML (ref 0.45–4.5)

## 2024-10-07 PROCEDURE — 36415 COLL VENOUS BLD VENIPUNCTURE: CPT

## 2024-10-07 PROCEDURE — 84146 ASSAY OF PROLACTIN: CPT

## 2024-10-07 PROCEDURE — 84443 ASSAY THYROID STIM HORMONE: CPT

## 2024-10-07 PROCEDURE — 83036 HEMOGLOBIN GLYCOSYLATED A1C: CPT

## 2024-10-07 PROCEDURE — 84403 ASSAY OF TOTAL TESTOSTERONE: CPT

## 2024-10-20 LAB
APOB+LDLR+PCSK9 GENE MUT ANL BLD/T: NOT DETECTED
BRCA1+BRCA2 DEL+DUP + FULL MUT ANL BLD/T: NOT DETECTED
MLH1+MSH2+MSH6+PMS2 GN DEL+DUP+FUL M: NOT DETECTED

## 2024-11-11 NOTE — PROGRESS NOTES
"Subjective:     Zenia Dorado is a 29 y.o.  female who presents for scheduled colposcopy and infection rescreen today. Zenia initially presentd with multiple concerns in 2024: pelvic pain (hx ovarian cysts and insertional pain with intercourse) and intermenstrual spotting. She was found to have a yeast infection and BV and was treated. Her lab work 10/2024 ws normal (T, PRL, TSH, CBC). Her ultrasound on 24 was remarkable for left CL and 1.5cm anterior fundal fibroid. Her Pap test was ASCUS, HPV HR+ other and she is scheduled for colposcopy today. She feels like she may have another yeast infection but is unsure as she is often asymptomatic.    Patient Active Problem List   Diagnosis    Nephrolithiasis    Hydronephrosis    Medullary sponge kidney     Past Medical History:   Diagnosis Date    COVID 2021    Kidney stone 2014    Kidney stones     started w/ kidney stones 1.5 years ago approx.     Past Surgical History:   Procedure Laterality Date    [place] LITHOTRIPSY XTRCORP SHOCK WAVE Left 10/11/2016    Procedure: ESWL ;  Surgeon: Rolly Horton MD;  [facility];  Service: Urology    MO OPEN TX PHALANGEAL SHAFT FRACTURE PROX/MIDDLE EA Left 3/9/2023    Procedure: CLOSED REDUCTION PERCUTANEOUS PINNING  LEFT SMALL  FINGER PROXIMAL PHALANX;  Surgeon: Fernando Bernal MD;  Location: [facility] MA[facility] OR;  Service: Orthopedics    WISDOM TOOTH EXTRACTION         Objective:    Vitals: Blood pressure 120/84, height 5' 2\" (1.575 m), weight 58.1 kg (128 lb), last menstrual period 2024.Body mass index is 23.41 kg/m².    Physical Exam  Vitals reviewed.   Constitutional:       General: She is not in acute distress.     Appearance: Normal appearance. She is well-developed. She is not ill-appearing, toxic-appearing or diaphoretic.   Cardiovascular:      Rate and Rhythm: Normal rate.   Pulmonary:      Effort: Pulmonary effort is normal. No respiratory distress.   Genitourinary:     General: " Normal vulva.      Exam position: Lithotomy position.      Labia:         Right: No rash, tenderness, lesion or injury.         Left: No rash, tenderness, lesion or injury.       Vagina: No signs of injury and foreign body. Vaginal discharge (Moderate amount of white discharge, possibly yeast but not as diagnostic as her prior exam) present. No erythema, tenderness, bleeding, lesions or prolapsed vaginal walls.      Cervix: No cervical motion tenderness, discharge, friability, lesion, erythema, cervical bleeding or eversion.   Skin:     General: Skin is warm and dry.   Neurological:      Mental Status: She is alert and oriented to person, place, and time.   Psychiatric:         Mood and Affect: Mood normal.         Behavior: Behavior normal.         Colposcopy    Date/Time: 11/15/2024 11:00 AM    Performed by: Citlaly Golden MD  Authorized by: Citlaly Golden MD    Verbal consent obtained?: Yes    Risks and benefits: Risks, benefits and alternatives were discussed    Consent given by:  Patient  Patient states understanding of procedure being performed: Yes    Patient's understanding of procedure matches consent: Yes    Procedure consent matches procedure scheduled: Yes    Required items: Required blood products, implants, devices and special equipment available    Patient identity confirmed:  Verbally with patient  Pre-procedure:     Premeds:  Ibuprofen    Prepped with: acetic acid    Indication:     Indication:  ASC-US  Procedure:     Procedure: Colposcopy w/ endocervical curettage      Under satisfactory analgesia the patient was prepped and draped in the dorsal lithotomy position: yes      [place] speculum was placed in the vagina: yes      Under colposcopic examination the transition zone was seen in entirety: yes (Very small SCJ)      Intracervical block was performed: no      Endocervix was curetted using a Kevorkian curette: yes      Monsel's solution was applied: no      Specimen(s) to pathology:  yes    Post-procedure:     Findings comment:  Unremarkable cervix    Impression: Low grade cervical dysplasia      Impression comment:  If any,    Patient tolerance of procedure:  Tolerated well, no immediate complications        Assessment/Plan:    Problem List Items Addressed This Visit    None  Visit Diagnoses         Pregnancy test negative    -  Primary    Relevant Orders    POCT urine HCG (Completed)      Yeast infection        Relevant Orders    Genital Comprehensive Culture (Completed)      Atypical squamous cell of undetermined significance of cervix        Relevant Orders    Tissue Exam            Citlaly Muhammad MD  OB/GYN  She/her  11/15/2024

## 2024-11-15 ENCOUNTER — PROCEDURE VISIT (OUTPATIENT)
Dept: OBGYN CLINIC | Facility: CLINIC | Age: 29
End: 2024-11-15
Payer: COMMERCIAL

## 2024-11-15 VITALS
BODY MASS INDEX: 23.55 KG/M2 | SYSTOLIC BLOOD PRESSURE: 120 MMHG | HEIGHT: 62 IN | WEIGHT: 128 LBS | DIASTOLIC BLOOD PRESSURE: 84 MMHG

## 2024-11-15 DIAGNOSIS — B37.9 YEAST INFECTION: ICD-10-CM

## 2024-11-15 DIAGNOSIS — R87.610 ATYPICAL SQUAMOUS CELL OF UNDETERMINED SIGNIFICANCE OF CERVIX: ICD-10-CM

## 2024-11-15 DIAGNOSIS — Z32.02 PREGNANCY TEST NEGATIVE: Primary | ICD-10-CM

## 2024-11-15 LAB — SL AMB POCT URINE HCG: NEGATIVE

## 2024-11-15 PROCEDURE — 57456 ENDOCERV CURETTAGE W/SCOPE: CPT | Performed by: OBSTETRICS & GYNECOLOGY

## 2024-11-15 PROCEDURE — 88305 TISSUE EXAM BY PATHOLOGIST: CPT | Performed by: PATHOLOGY

## 2024-11-15 PROCEDURE — 81025 URINE PREGNANCY TEST: CPT | Performed by: OBSTETRICS & GYNECOLOGY

## 2024-11-15 PROCEDURE — 87070 CULTURE OTHR SPECIMN AEROBIC: CPT | Performed by: OBSTETRICS & GYNECOLOGY

## 2024-11-18 LAB — BACTERIA GENITAL AEROBE CULT: NORMAL

## 2024-11-19 ENCOUNTER — RESULTS FOLLOW-UP (OUTPATIENT)
Dept: OBGYN CLINIC | Facility: CLINIC | Age: 29
End: 2024-11-19

## 2024-11-21 PROCEDURE — 88305 TISSUE EXAM BY PATHOLOGIST: CPT | Performed by: PATHOLOGY

## 2025-01-14 ENCOUNTER — APPOINTMENT (EMERGENCY)
Dept: RADIOLOGY | Facility: HOSPITAL | Age: 30
End: 2025-01-14
Payer: COMMERCIAL

## 2025-01-14 ENCOUNTER — APPOINTMENT (EMERGENCY)
Dept: CT IMAGING | Facility: HOSPITAL | Age: 30
End: 2025-01-14
Payer: COMMERCIAL

## 2025-01-14 ENCOUNTER — HOSPITAL ENCOUNTER (EMERGENCY)
Facility: HOSPITAL | Age: 30
Discharge: HOME | End: 2025-01-14
Attending: EMERGENCY MEDICINE | Admitting: EMERGENCY MEDICINE
Payer: COMMERCIAL

## 2025-01-14 VITALS
TEMPERATURE: 98.2 F | RESPIRATION RATE: 18 BRPM | HEART RATE: 105 BPM | OXYGEN SATURATION: 100 % | SYSTOLIC BLOOD PRESSURE: 140 MMHG | DIASTOLIC BLOOD PRESSURE: 102 MMHG

## 2025-01-14 DIAGNOSIS — S16.1XXA STRAIN OF NECK MUSCLE, INITIAL ENCOUNTER: ICD-10-CM

## 2025-01-14 DIAGNOSIS — R11.10 VOMITING: Primary | ICD-10-CM

## 2025-01-14 LAB
ABO GROUP BLD: NORMAL
ALBUMIN SERPL BCG-MCNC: 5 G/DL (ref 3.5–5)
ALP SERPL-CCNC: 46 U/L (ref 34–104)
ALT SERPL W P-5'-P-CCNC: 8 U/L (ref 7–52)
ANION GAP SERPL CALCULATED.3IONS-SCNC: 11 MMOL/L (ref 4–13)
APTT PPP: 26 SECONDS (ref 23–34)
AST SERPL W P-5'-P-CCNC: 13 U/L (ref 13–39)
BASOPHILS # BLD AUTO: 0.03 THOUSANDS/ΜL (ref 0–0.1)
BASOPHILS NFR BLD AUTO: 0 % (ref 0–1)
BILIRUB SERPL-MCNC: 0.55 MG/DL (ref 0.2–1)
BILIRUB UR QL STRIP: NEGATIVE
BLD GP AB SCN SERPL QL: NEGATIVE
BUN SERPL-MCNC: 9 MG/DL (ref 5–25)
CALCIUM SERPL-MCNC: 9.9 MG/DL (ref 8.4–10.2)
CHLORIDE SERPL-SCNC: 102 MMOL/L (ref 96–108)
CLARITY UR: CLEAR
CO2 SERPL-SCNC: 25 MMOL/L (ref 21–32)
COLOR UR: YELLOW
CREAT SERPL-MCNC: 0.7 MG/DL (ref 0.6–1.3)
EOSINOPHIL # BLD AUTO: 0.08 THOUSAND/ΜL (ref 0–0.61)
EOSINOPHIL NFR BLD AUTO: 1 % (ref 0–6)
ERYTHROCYTE [DISTWIDTH] IN BLOOD BY AUTOMATED COUNT: 11.9 % (ref 11.6–15.1)
EXT PREGNANCY TEST URINE: NEGATIVE
EXT. CONTROL: NORMAL
GFR SERPL CREATININE-BSD FRML MDRD: 117 ML/MIN/1.73SQ M
GLUCOSE SERPL-MCNC: 91 MG/DL (ref 65–140)
GLUCOSE UR STRIP-MCNC: NEGATIVE MG/DL
HCT VFR BLD AUTO: 41.3 % (ref 34.8–46.1)
HGB BLD-MCNC: 14 G/DL (ref 11.5–15.4)
HGB UR QL STRIP.AUTO: NEGATIVE
IMM GRANULOCYTES # BLD AUTO: 0.03 THOUSAND/UL (ref 0–0.2)
IMM GRANULOCYTES NFR BLD AUTO: 0 % (ref 0–2)
INR PPP: 1.08 (ref 0.85–1.19)
KETONES UR STRIP-MCNC: ABNORMAL MG/DL
LEUKOCYTE ESTERASE UR QL STRIP: NEGATIVE
LYMPHOCYTES # BLD AUTO: 2.41 THOUSANDS/ΜL (ref 0.6–4.47)
LYMPHOCYTES NFR BLD AUTO: 25 % (ref 14–44)
MCH RBC QN AUTO: 32.4 PG (ref 26.8–34.3)
MCHC RBC AUTO-ENTMCNC: 33.9 G/DL (ref 31.4–37.4)
MCV RBC AUTO: 96 FL (ref 82–98)
MONOCYTES # BLD AUTO: 0.65 THOUSAND/ΜL (ref 0.17–1.22)
MONOCYTES NFR BLD AUTO: 7 % (ref 4–12)
NEUTROPHILS # BLD AUTO: 6.32 THOUSANDS/ΜL (ref 1.85–7.62)
NEUTS SEG NFR BLD AUTO: 67 % (ref 43–75)
NITRITE UR QL STRIP: NEGATIVE
NRBC BLD AUTO-RTO: 0 /100 WBCS
PH UR STRIP.AUTO: 8 [PH]
PLATELET # BLD AUTO: 344 THOUSANDS/UL (ref 149–390)
PMV BLD AUTO: 8.9 FL (ref 8.9–12.7)
POTASSIUM SERPL-SCNC: 3.7 MMOL/L (ref 3.5–5.3)
PROT SERPL-MCNC: 7.8 G/DL (ref 6.4–8.4)
PROT UR STRIP-MCNC: NEGATIVE MG/DL
PROTHROMBIN TIME: 14.4 SECONDS (ref 12.3–15)
RBC # BLD AUTO: 4.32 MILLION/UL (ref 3.81–5.12)
RH BLD: POSITIVE
SODIUM SERPL-SCNC: 138 MMOL/L (ref 135–147)
SP GR UR STRIP.AUTO: 1.02 (ref 1–1.03)
SPECIMEN EXPIRATION DATE: NORMAL
UROBILINOGEN UR QL STRIP.AUTO: 0.2 E.U./DL
WBC # BLD AUTO: 9.52 THOUSAND/UL (ref 4.31–10.16)

## 2025-01-14 PROCEDURE — 99285 EMERGENCY DEPT VISIT HI MDM: CPT | Performed by: EMERGENCY MEDICINE

## 2025-01-14 PROCEDURE — 71260 CT THORAX DX C+: CPT

## 2025-01-14 PROCEDURE — 85025 COMPLETE CBC W/AUTO DIFF WBC: CPT | Performed by: EMERGENCY MEDICINE

## 2025-01-14 PROCEDURE — 80053 COMPREHEN METABOLIC PANEL: CPT | Performed by: EMERGENCY MEDICINE

## 2025-01-14 PROCEDURE — 36415 COLL VENOUS BLD VENIPUNCTURE: CPT | Performed by: EMERGENCY MEDICINE

## 2025-01-14 PROCEDURE — 86901 BLOOD TYPING SEROLOGIC RH(D): CPT | Performed by: EMERGENCY MEDICINE

## 2025-01-14 PROCEDURE — 93005 ELECTROCARDIOGRAM TRACING: CPT

## 2025-01-14 PROCEDURE — 70360 X-RAY EXAM OF NECK: CPT

## 2025-01-14 PROCEDURE — 85610 PROTHROMBIN TIME: CPT | Performed by: EMERGENCY MEDICINE

## 2025-01-14 PROCEDURE — 81003 URINALYSIS AUTO W/O SCOPE: CPT | Performed by: EMERGENCY MEDICINE

## 2025-01-14 PROCEDURE — 96375 TX/PRO/DX INJ NEW DRUG ADDON: CPT

## 2025-01-14 PROCEDURE — 86850 RBC ANTIBODY SCREEN: CPT | Performed by: EMERGENCY MEDICINE

## 2025-01-14 PROCEDURE — 99285 EMERGENCY DEPT VISIT HI MDM: CPT

## 2025-01-14 PROCEDURE — 96374 THER/PROPH/DIAG INJ IV PUSH: CPT

## 2025-01-14 PROCEDURE — 70491 CT SOFT TISSUE NECK W/DYE: CPT

## 2025-01-14 PROCEDURE — 85730 THROMBOPLASTIN TIME PARTIAL: CPT | Performed by: EMERGENCY MEDICINE

## 2025-01-14 PROCEDURE — 81025 URINE PREGNANCY TEST: CPT | Performed by: EMERGENCY MEDICINE

## 2025-01-14 PROCEDURE — 86900 BLOOD TYPING SEROLOGIC ABO: CPT | Performed by: EMERGENCY MEDICINE

## 2025-01-14 RX ORDER — MAGNESIUM HYDROXIDE/ALUMINUM HYDROXICE/SIMETHICONE 120; 1200; 1200 MG/30ML; MG/30ML; MG/30ML
30 SUSPENSION ORAL ONCE
Status: COMPLETED | OUTPATIENT
Start: 2025-01-14 | End: 2025-01-14

## 2025-01-14 RX ORDER — PANTOPRAZOLE SODIUM 40 MG/10ML
40 INJECTION, POWDER, LYOPHILIZED, FOR SOLUTION INTRAVENOUS ONCE
Status: COMPLETED | OUTPATIENT
Start: 2025-01-14 | End: 2025-01-14

## 2025-01-14 RX ORDER — LIDOCAINE HYDROCHLORIDE 20 MG/ML
15 SOLUTION OROPHARYNGEAL ONCE
Status: COMPLETED | OUTPATIENT
Start: 2025-01-14 | End: 2025-01-14

## 2025-01-14 RX ORDER — ONDANSETRON 4 MG/1
4 TABLET, ORALLY DISINTEGRATING ORAL EVERY 8 HOURS PRN
Qty: 20 TABLET | Refills: 0 | Status: SHIPPED | OUTPATIENT
Start: 2025-01-14 | End: 2025-01-21

## 2025-01-14 RX ORDER — ONDANSETRON 2 MG/ML
4 INJECTION INTRAMUSCULAR; INTRAVENOUS ONCE
Status: COMPLETED | OUTPATIENT
Start: 2025-01-14 | End: 2025-01-14

## 2025-01-14 RX ADMIN — IOHEXOL 85 ML: 350 INJECTION, SOLUTION INTRAVENOUS at 15:18

## 2025-01-14 RX ADMIN — LIDOCAINE HYDROCHLORIDE 15 ML: 20 SOLUTION ORAL at 15:52

## 2025-01-14 RX ADMIN — ONDANSETRON 4 MG: 2 INJECTION INTRAMUSCULAR; INTRAVENOUS at 14:35

## 2025-01-14 RX ADMIN — ALUMINUM HYDROXIDE, MAGNESIUM HYDROXIDE, AND DIMETHICONE 30 ML: 200; 20; 200 SUSPENSION ORAL at 15:52

## 2025-01-14 RX ADMIN — PANTOPRAZOLE SODIUM 40 MG: 40 INJECTION, POWDER, FOR SOLUTION INTRAVENOUS at 14:35

## 2025-01-14 NOTE — Clinical Note
Zenia Dorado was seen and treated in our emergency department on 1/14/2025.                Diagnosis:     Zenia  may return to work on return date.    She may return on this date: 01/16/2025         If you have any questions or concerns, please don't hesitate to call.      Bhavani Lee, DO    ______________________________           _______________          _______________  Hospital Representative                              Date                                Time

## 2025-01-14 NOTE — ED PROVIDER NOTES
Time reflects when diagnosis was documented in both MDM as applicable and the Disposition within this note       Time User Action Codes Description Comment    1/14/2025  3:41 PM Bhavani Lee Add [R11.10] Vomiting     1/14/2025  3:41 PM Bhavani Lee Add [S16.1XXA] Strain of neck muscle, initial encounter           ED Disposition       ED Disposition   Discharge    Condition   Stable    Date/Time   Tue Jan 14, 2025  3:40 PM    Comment   Zenia Dorado discharge to home/self care.                   Assessment & Plan       Medical Decision Making  Differential diagnosis includes but is not limited to viral illness, vomiting, anemia, electrolyte abnormality, GI bleed, pneumonia, Boerhaave syndrome, Cecily-Durán tear,    Problems Addressed:  Strain of neck muscle, initial encounter: acute illness or injury  Vomiting: acute illness or injury    Amount and/or Complexity of Data Reviewed  Labs: ordered. Decision-making details documented in ED Course.  Radiology: ordered and independent interpretation performed. Decision-making details documented in ED Course.     Details: Soft tissue neck shows no free air    Risk  OTC drugs.  Prescription drug management.  Risk Details: Discussed with patient trying over-the-counter Pepcid        ED Course as of 01/14/25 1548   Tue Jan 14, 2025   1548 Patient has not vomited since she has been in the department.  Patient complaining of midepigastric pain.  Will try GI cocktail we did give her IV Protonix and Zofran here.       Medications   Diclofenac Sodium (VOLTAREN) 1 % topical gel 2 g (has no administration in time range)   aluminum-magnesium hydroxide-simethicone (MAALOX) oral suspension 30 mL (has no administration in time range)   Lidocaine Viscous HCl (XYLOCAINE) 2 % mucosal solution 15 mL (has no administration in time range)   ondansetron (ZOFRAN) injection 4 mg (4 mg Intravenous Given 1/14/25 1435)   pantoprazole (PROTONIX) injection 40 mg (40 mg Intravenous Given  "1/14/25 1435)   iohexol (OMNIPAQUE) 350 MG/ML injection (SINGLE-DOSE) 85 mL (85 mL Intravenous Given 1/14/25 1518)       ED Risk Strat Scores                          SBIRT 22yo+      Flowsheet Row Most Recent Value   Initial Alcohol Screen: US AUDIT-C     1. How often do you have a drink containing alcohol? 0 Filed at: 01/14/2025 1349   2. How many drinks containing alcohol do you have on a typical day you are drinking?  0 Filed at: 01/14/2025 1345   3b. FEMALE Any Age, or MALE 65+: How often do you have 4 or more drinks on one occassion? 0 Filed at: 01/14/2025 1340   Audit-C Score 0 Filed at: 01/14/2025 1343   VENECIA: How many times in the past year have you...    Used an illegal drug or used a prescription medication for non-medical reasons? Never Filed at: 01/14/2025 1341                            History of Present Illness       Chief Complaint   Patient presents with    GI Bleeding     Nasal congestion, feeling \"crappy\" last week.  Developed vomiting this morning and states she vomited \"a decent amt of blood in the toilet\"  now has pain up her neck and tastes blood       Past Medical History:   Diagnosis Date    COVID 01/2021    Kidney stone 01/2014    Kidney stones     started w/ kidney stones 1.5 years ago approx.      Past Surgical History:   Procedure Laterality Date    [place] LITHOTRIPSY XTRCORP SHOCK WAVE Left 10/11/2016    Procedure: ESWL ;  Surgeon: Rolly Horton MD;  [facility];  Service: Urology    [place] OPEN TX PHALANGEAL SHAFT FRACTURE PROX/MIDDLE EA Left 3/9/2023    Procedure: CLOSED REDUCTION PERCUTANEOUS PINNING  LEFT SMALL  FINGER PROXIMAL PHALANX;  Surgeon: Fernando Bernal MD;  Location: [facility] MAIN OR;  Service: Orthopedics    WISDOM TOOTH EXTRACTION        Family History   Problem Relation Age of Onset    Nephrolithiasis Mother     Lung disease Father     Breast cancer Paternal Grandmother       Social History     Tobacco Use    Smoking status: Former     Current packs/day: 0.00 " "    Types: Cigarettes    Smokeless tobacco: Never   Vaping Use    Vaping status: Never Used   Substance Use Topics    Alcohol use: Not Currently     Comment: socially    Drug use: No      E-Cigarette/Vaping    E-Cigarette Use Never User       E-Cigarette/Vaping Substances      I have reviewed and agree with the history as documented.     29-year-old female comes in for evaluation of vomiting.  Patient states that she had URI symptoms for approximately a week and then this morning became nauseous and started to vomit.  Patient states the last time she vomited was \"very forceful\".  She has had some blood in her vomit.  She now has pain in the left side of her neck that extends into her chest.  Feels like she cannot take a deep breath.  Patient is concerned because she was a contact with a goose that \"might have AVN bird flu\"      History provided by:  Patient   used: No    Vomiting  Severity:  Severe  Duration:  1 day  Timing:  Constant  Quality:  Stomach contents  Progression:  Worsening  Chronicity:  New  Context: post-tussive    Relieved by:  Nothing  Associated symptoms: cough, myalgias and sore throat    Associated symptoms: no abdominal pain, no arthralgias, no diarrhea, no fever and no headaches    Risk factors: sick contacts        Review of Systems   Constitutional:  Negative for fatigue and fever.   HENT:  Positive for sore throat. Negative for congestion and ear pain.    Eyes:  Negative for discharge and redness.   Respiratory:  Positive for cough. Negative for apnea, shortness of breath and wheezing.    Cardiovascular:  Negative for chest pain.   Gastrointestinal:  Positive for vomiting. Negative for abdominal pain and diarrhea.   Endocrine: Negative for cold intolerance and polydipsia.   Genitourinary:  Negative for difficulty urinating and hematuria.   Musculoskeletal:  Positive for myalgias. Negative for arthralgias and back pain.   Skin:  Negative for color change and rash. "   Allergic/Immunologic: Negative for environmental allergies and immunocompromised state.   Neurological:  Negative for numbness and headaches.   Hematological:  Negative for adenopathy. Does not bruise/bleed easily.   Psychiatric/Behavioral:  Negative for agitation and behavioral problems.            Objective       ED Triage Vitals   Temperature Pulse Blood Pressure Respirations SpO2 Patient Position - Orthostatic VS   01/14/25 1342 01/14/25 1347 01/14/25 1347 01/14/25 1342 01/14/25 1342 01/14/25 1342   98.2 °F (36.8 °C) 105 (!) 140/102 18 100 % Sitting      Temp Source Heart Rate Source BP Location FiO2 (%) Pain Score    01/14/25 1342 01/14/25 1342 01/14/25 1342 -- 01/14/25 1342    Oral Monitor Left arm  7      Vitals      Date and Time Temp Pulse SpO2 Resp BP Pain Score FACES Pain Rating User   01/14/25 1347 -- 105 -- -- 140/102 -- -- RR   01/14/25 1342 98.2 °F (36.8 °C) -- 100 % 18 -- 7 -- RR            Physical Exam  Constitutional:       Appearance: She is well-developed.   HENT:      Head: Normocephalic and atraumatic.      Right Ear: External ear normal.      Left Ear: External ear normal.   Eyes:      Conjunctiva/sclera: Conjunctivae normal.      Pupils: Pupils are equal, round, and reactive to light.   Neck:      Thyroid: No thyroid mass.      Vascular: No carotid bruit or JVD.      Trachea: Trachea normal.        Comments: No crepitus appreciated  Cardiovascular:      Rate and Rhythm: Normal rate and regular rhythm.      Pulses: Normal pulses.      Heart sounds: Normal heart sounds, S1 normal and S2 normal.   Pulmonary:      Effort: Pulmonary effort is normal.      Breath sounds: Normal breath sounds. No wheezing, rhonchi or rales.   Abdominal:      General: Bowel sounds are normal.      Palpations: Abdomen is soft.      Tenderness: There is no abdominal tenderness. There is no guarding or rebound.   Genitourinary:     Exam position: Supine.      Vagina: No bleeding.   Musculoskeletal:         General:  Normal range of motion.      Cervical back: Normal range of motion and neck supple.   Lymphadenopathy:      Cervical: No cervical adenopathy.   Skin:     General: Skin is warm and dry.   Neurological:      Mental Status: She is alert and oriented to person, place, and time.      GCS: GCS eye subscore is 4. GCS verbal subscore is 5. GCS motor subscore is 6.      Cranial Nerves: No cranial nerve deficit.      Sensory: No sensory deficit.      Deep Tendon Reflexes: Reflexes are normal and symmetric.   Psychiatric:         Speech: Speech normal.         Behavior: Behavior normal. Behavior is cooperative.         Thought Content: Thought content normal.       Results Reviewed       Procedure Component Value Units Date/Time    Comprehensive metabolic panel [[id number]] Collected: 01/14/25 1426    Lab Status: Final result Specimen: Blood from Arm, Right Updated: 01/14/25 1455     Sodium 138 mmol/L      Potassium 3.7 mmol/L      Chloride 102 mmol/L      CO2 25 mmol/L      ANION GAP 11 mmol/L      BUN 9 mg/dL      Creatinine 0.70 mg/dL      Glucose 91 mg/dL      Calcium 9.9 mg/dL      AST 13 U/L      ALT 8 U/L      Alkaline Phosphatase 46 U/L      Total Protein 7.8 g/dL      Albumin 5.0 g/dL      Total Bilirubin 0.55 mg/dL      eGFR 117 ml/min/1.73sq m     Narrative:      National Kidney Disease Foundation guidelines for Chronic Kidney Disease (CKD):     Stage 1 with normal or high GFR (GFR > 90 mL/min/1.73 square meters)    Stage 2 Mild CKD (GFR = 60-89 mL/min/1.73 square meters)    Stage 3A Moderate CKD (GFR = 45-59 mL/min/1.73 square meters)    Stage 3B Moderate CKD (GFR = 30-44 mL/min/1.73 square meters)    Stage 4 Severe CKD (GFR = 15-29 mL/min/1.73 square meters)    Stage 5 End Stage CKD (GFR <15 mL/min/1.73 square meters)  Note: GFR calculation is accurate only with a steady state creatinine    Protime-INR [[id number]]  (Normal) Collected: 01/14/25 1426    Lab Status: Final result Specimen: Blood from Arm, Right  Updated: 01/14/25 1450     Protime 14.4 seconds      INR 1.08    Narrative:      INR Therapeutic Range    Indication                                             INR Range      Atrial Fibrillation                                               2.0-3.0  Hypercoagulable State                                    2.0.2.3  Left Ventricular Asist Device                            2.0-3.0  Mechanical Heart Valve                                  -    Aortic(with afib, MI, embolism, HF, LA enlargement,    and/or coagulopathy)                                     2.0-3.0 (2.5-3.5)     Mitral                                                             2.5-3.5  Prosthetic/Bioprosthetic Heart Valve               2.0-3.0  Venous thromboembolism (VTE: VT, PE        2.0-3.0    APTT [[id number]]  (Normal) Collected: 01/14/25 1426    Lab Status: Final result Specimen: Blood from Arm, Right Updated: 01/14/25 1450     PTT 26 seconds     UA (URINE) with reflex to Scope [[id number]]  (Abnormal) Collected: 01/14/25 1428    Lab Status: Final result Specimen: Urine, Clean Catch Updated: 01/14/25 1448     Color, UA Yellow     Clarity, UA Clear     Specific Gravity, UA 1.020     pH, UA 8.0     Leukocytes, UA Negative     Nitrite, UA Negative     Protein, UA Negative mg/dl      Glucose, UA Negative mg/dl      Ketones, UA Trace mg/dl      Urobilinogen, UA 0.2 E.U./dl      Bilirubin, UA Negative     Occult Blood, UA Negative    CBC and differential [[id number]] Collected: 01/14/25 1426    Lab Status: Final result Specimen: Blood from Arm, Right Updated: 01/14/25 1436     WBC 9.52 Thousand/uL      RBC 4.32 Million/uL      Hemoglobin 14.0 g/dL      Hematocrit 41.3 %      MCV 96 fL      MCH 32.4 pg      MCHC 33.9 g/dL      RDW 11.9 %      MPV 8.9 fL      Platelets 344 Thousands/uL      nRBC 0 /100 WBCs      Segmented % 67 %      Immature Grans % 0 %      Lymphocytes % 25 %      Monocytes % 7 %      Eosinophils Relative 1 %      Basophils Relative 0 %       Absolute Neutrophils 6.32 Thousands/µL      Absolute Immature Grans 0.03 Thousand/uL      Absolute Lymphocytes 2.41 Thousands/µL      Absolute Monocytes 0.65 Thousand/µL      Eosinophils Absolute 0.08 Thousand/µL      Basophils Absolute 0.03 Thousands/µL     POCT pregnancy, urine [[id number]]  (Normal) Collected: 01/14/25 1435    Lab Status: Final result Updated: 01/14/25 1435     EXT Preg Test, Ur Negative     Control Valid            CT chest with contrast   Final Interpretation by Jeremy Londono MD (01/14 1534)      Normal CT of the chest. No acute abnormality.               Workstation performed: [id number]         CT soft tissue neck with contrast   Final Interpretation by Jeremy Londono MD (01/14 1532)      Normal CT of the neck. No acute abnormality.            Workstation performed: [id number]         XR neck soft tissue   Final Interpretation by Aroldo Lim MD (01/14 1517)      Unremarkable neck soft tissue radiograph.      Workstation performed: [id number]             Procedures    ED Medication and Procedure Management   Prior to Admission Medications   Prescriptions Last Dose Informant Patient Reported? Taking?   Multiple Vitamins-Minerals (WOMENS MULTI VITAMIN & MINERAL PO)   Yes No   Sig: Take by mouth      Facility-Administered Medications: None     Current Discharge Medication List        START taking these medications    Details   ondansetron (ZOFRAN-ODT) 4 mg disintegrating tablet Take 1 tablet (4 mg total) by mouth every 8 (eight) hours as needed for nausea or vomiting for up to 7 days  Qty: 20 tablet, Refills: 0    Associated Diagnoses: Vomiting           CONTINUE these medications which have NOT CHANGED    Details   Multiple Vitamins-Minerals (WOMENS MULTI VITAMIN & MINERAL PO) Take by mouth           No discharge procedures on file.  ED SEPSIS DOCUMENTATION   Time reflects when diagnosis was documented in both MDM as applicable and the Disposition within this note       Time User Action Codes  Description Comment    1/14/2025  3:41 PM Bhavani Lee Add [R11.10] Vomiting     1/14/2025  3:41 PM Bhavani Lee Add [S16.1XXA] Strain of neck muscle, initial encounter                  Bhavani Lee, DO  01/14/25 1548

## 2025-01-15 LAB
ATRIAL RATE: 87 BPM
P AXIS: 77 DEGREES
PR INTERVAL: 128 MS
QRS AXIS: 78 DEGREES
QRSD INTERVAL: 84 MS
QT INTERVAL: 382 MS
QTC INTERVAL: 459 MS
T WAVE AXIS: 53 DEGREES
VENTRICULAR RATE: 87 BPM

## 2025-01-15 PROCEDURE — 93010 ELECTROCARDIOGRAM REPORT: CPT | Performed by: INTERNAL MEDICINE

## 2025-04-06 ENCOUNTER — HOSPITAL ENCOUNTER (EMERGENCY)
Facility: HOSPITAL | Age: 30
Discharge: HOME | End: 2025-04-06
Attending: EMERGENCY MEDICINE
Payer: COMMERCIAL

## 2025-04-06 ENCOUNTER — APPOINTMENT (EMERGENCY)
Dept: CT IMAGING | Facility: HOSPITAL | Age: 30
End: 2025-04-06
Payer: COMMERCIAL

## 2025-04-06 VITALS
HEART RATE: 100 BPM | RESPIRATION RATE: 16 BRPM | OXYGEN SATURATION: 100 % | TEMPERATURE: 98.5 F | DIASTOLIC BLOOD PRESSURE: 96 MMHG | SYSTOLIC BLOOD PRESSURE: 130 MMHG

## 2025-04-06 DIAGNOSIS — R10.9 FLANK PAIN: Primary | ICD-10-CM

## 2025-04-06 LAB
ALBUMIN SERPL BCG-MCNC: 4.6 G/DL (ref 3.5–5)
ALP SERPL-CCNC: 43 U/L (ref 34–104)
ALT SERPL W P-5'-P-CCNC: 6 U/L (ref 7–52)
ANION GAP SERPL CALCULATED.3IONS-SCNC: 8 MMOL/L (ref 4–13)
AST SERPL W P-5'-P-CCNC: 12 U/L (ref 13–39)
BASOPHILS # BLD AUTO: 0.03 THOUSANDS/ÂΜL (ref 0–0.1)
BASOPHILS NFR BLD AUTO: 0 % (ref 0–1)
BILIRUB SERPL-MCNC: 0.44 MG/DL (ref 0.2–1)
BILIRUB UR QL STRIP: NEGATIVE
BUN SERPL-MCNC: 7 MG/DL (ref 5–25)
CALCIUM SERPL-MCNC: 9.4 MG/DL (ref 8.4–10.2)
CHLORIDE SERPL-SCNC: 104 MMOL/L (ref 96–108)
CLARITY UR: CLEAR
CO2 SERPL-SCNC: 26 MMOL/L (ref 21–32)
COLOR UR: YELLOW
CREAT SERPL-MCNC: 0.75 MG/DL (ref 0.6–1.3)
EOSINOPHIL # BLD AUTO: 0.09 THOUSAND/ÂΜL (ref 0–0.61)
EOSINOPHIL NFR BLD AUTO: 1 % (ref 0–6)
ERYTHROCYTE [DISTWIDTH] IN BLOOD BY AUTOMATED COUNT: 11.7 % (ref 11.6–15.1)
EXT PREGNANCY TEST URINE: NEGATIVE
EXT. CONTROL: NORMAL
GFR SERPL CREATININE-BSD FRML MDRD: 108 ML/MIN/1.73SQ M
GLUCOSE SERPL-MCNC: 96 MG/DL (ref 65–140)
GLUCOSE UR STRIP-MCNC: NEGATIVE MG/DL
HCT VFR BLD AUTO: 38.7 % (ref 34.8–46.1)
HGB BLD-MCNC: 13.1 G/DL (ref 11.5–15.4)
HGB UR QL STRIP.AUTO: NEGATIVE
IMM GRANULOCYTES # BLD AUTO: 0.01 THOUSAND/UL (ref 0–0.2)
IMM GRANULOCYTES NFR BLD AUTO: 0 % (ref 0–2)
KETONES UR STRIP-MCNC: NEGATIVE MG/DL
LEUKOCYTE ESTERASE UR QL STRIP: NEGATIVE
LIPASE SERPL-CCNC: 21 U/L (ref 11–82)
LYMPHOCYTES # BLD AUTO: 2.32 THOUSANDS/ÂΜL (ref 0.6–4.47)
LYMPHOCYTES NFR BLD AUTO: 29 % (ref 14–44)
MCH RBC QN AUTO: 32.3 PG (ref 26.8–34.3)
MCHC RBC AUTO-ENTMCNC: 33.9 G/DL (ref 31.4–37.4)
MCV RBC AUTO: 95 FL (ref 82–98)
MONOCYTES # BLD AUTO: 0.6 THOUSAND/ÂΜL (ref 0.17–1.22)
MONOCYTES NFR BLD AUTO: 8 % (ref 4–12)
NEUTROPHILS # BLD AUTO: 4.83 THOUSANDS/ÂΜL (ref 1.85–7.62)
NEUTS SEG NFR BLD AUTO: 62 % (ref 43–75)
NITRITE UR QL STRIP: NEGATIVE
NRBC BLD AUTO-RTO: 0 /100 WBCS
PH UR STRIP.AUTO: 7 [PH]
PLATELET # BLD AUTO: 297 THOUSANDS/UL (ref 149–390)
PMV BLD AUTO: 8.6 FL (ref 8.9–12.7)
POTASSIUM SERPL-SCNC: 4.1 MMOL/L (ref 3.5–5.3)
PROT SERPL-MCNC: 6.9 G/DL (ref 6.4–8.4)
PROT UR STRIP-MCNC: NEGATIVE MG/DL
RBC # BLD AUTO: 4.06 MILLION/UL (ref 3.81–5.12)
SODIUM SERPL-SCNC: 138 MMOL/L (ref 135–147)
SP GR UR STRIP.AUTO: 1.01 (ref 1–1.03)
UROBILINOGEN UR QL STRIP.AUTO: 0.2 E.U./DL
WBC # BLD AUTO: 7.88 THOUSAND/UL (ref 4.31–10.16)

## 2025-04-06 PROCEDURE — 99284 EMERGENCY DEPT VISIT MOD MDM: CPT

## 2025-04-06 PROCEDURE — 80053 COMPREHEN METABOLIC PANEL: CPT | Performed by: EMERGENCY MEDICINE

## 2025-04-06 PROCEDURE — 99285 EMERGENCY DEPT VISIT HI MDM: CPT | Performed by: EMERGENCY MEDICINE

## 2025-04-06 PROCEDURE — 83690 ASSAY OF LIPASE: CPT | Performed by: EMERGENCY MEDICINE

## 2025-04-06 PROCEDURE — 96361 HYDRATE IV INFUSION ADD-ON: CPT

## 2025-04-06 PROCEDURE — 81003 URINALYSIS AUTO W/O SCOPE: CPT | Performed by: EMERGENCY MEDICINE

## 2025-04-06 PROCEDURE — 85025 COMPLETE CBC W/AUTO DIFF WBC: CPT | Performed by: EMERGENCY MEDICINE

## 2025-04-06 PROCEDURE — 96375 TX/PRO/DX INJ NEW DRUG ADDON: CPT

## 2025-04-06 PROCEDURE — 74176 CT ABD & PELVIS W/O CONTRAST: CPT

## 2025-04-06 PROCEDURE — 96374 THER/PROPH/DIAG INJ IV PUSH: CPT

## 2025-04-06 PROCEDURE — 81025 URINE PREGNANCY TEST: CPT | Performed by: EMERGENCY MEDICINE

## 2025-04-06 PROCEDURE — 36415 COLL VENOUS BLD VENIPUNCTURE: CPT | Performed by: EMERGENCY MEDICINE

## 2025-04-06 RX ORDER — MORPHINE SULFATE 4 MG/ML
4 INJECTION, SOLUTION INTRAMUSCULAR; INTRAVENOUS ONCE
Status: COMPLETED | OUTPATIENT
Start: 2025-04-06 | End: 2025-04-06

## 2025-04-06 RX ORDER — OXYCODONE AND ACETAMINOPHEN 5; 325 MG/1; MG/1
1 TABLET ORAL EVERY 4 HOURS PRN
Qty: 12 TABLET | Refills: 0 | Status: SHIPPED | OUTPATIENT
Start: 2025-04-06 | End: 2025-04-16

## 2025-04-06 RX ORDER — ONDANSETRON 2 MG/ML
4 INJECTION INTRAMUSCULAR; INTRAVENOUS ONCE
Status: COMPLETED | OUTPATIENT
Start: 2025-04-06 | End: 2025-04-06

## 2025-04-06 RX ORDER — KETOROLAC TROMETHAMINE 30 MG/ML
15 INJECTION, SOLUTION INTRAMUSCULAR; INTRAVENOUS ONCE
Status: COMPLETED | OUTPATIENT
Start: 2025-04-06 | End: 2025-04-06

## 2025-04-06 RX ADMIN — SODIUM CHLORIDE 1000 ML: 0.9 INJECTION, SOLUTION INTRAVENOUS at 13:53

## 2025-04-06 RX ADMIN — KETOROLAC TROMETHAMINE 15 MG: 30 INJECTION, SOLUTION INTRAMUSCULAR at 13:52

## 2025-04-06 RX ADMIN — ONDANSETRON 4 MG: 2 INJECTION INTRAMUSCULAR; INTRAVENOUS at 14:02

## 2025-04-06 RX ADMIN — MORPHINE SULFATE 4 MG: 4 INJECTION INTRAVENOUS at 14:22

## 2025-04-06 NOTE — Clinical Note
Zenia Dorado was seen and treated in our emergency department on 4/6/2025.                Diagnosis:     Zenia  may return to work on return date.    She may return on this date: 04/07/2025         If you have any questions or concerns, please don't hesitate to call.      Elias Green RN    ______________________________           _______________          _______________  Hospital Representative                              Date                                Time

## 2025-04-06 NOTE — ED NOTES
Patient transported to CT, will medicate with zofran upon return.     Yee Courtney RN  04/06/25 [id number]

## 2025-04-06 NOTE — Clinical Note
Zenia Dorado was seen and treated in our emergency department on 4/6/2025.                Diagnosis:     Zenia  may return to work on return date.    She may return on this date: 04/07/2025         If you have any questions or concerns, please don't hesitate to call.      Ld Petty, DO    ______________________________           _______________          _______________  Hospital Representative                              Date                                Time

## 2025-04-06 NOTE — ED PROVIDER NOTES
Time reflects when diagnosis was documented in both MDM as applicable and the Disposition within this note       Time User Action Codes Description Comment    4/6/2025  2:42 PM Ld Petty Add [R10.9] Flank pain           ED Disposition       ED Disposition   Discharge    Condition   Stable    Date/Time   Sun Apr 6, 2025  2:42 PM    Comment   Zenia Dorado discharge to home/self care.                   Assessment & Plan       Medical Decision Making  This is a 29-year-old female presents to the emergency department complaining of left flank pain.  I considered nephrolithiasis, pyelonephritis, colitis. These and other diagnoses were considered.         Amount and/or Complexity of Data Reviewed  Labs: ordered.  Radiology: ordered.    Risk  Prescription drug management.        ED Course as of 04/06/25 1512   Sun Apr 06, 2025   1510 The patient had lab work that was significant for a white count of 7.8, a creatinine of 0.75, hemoglobin of 13.1.  She had a UA that was unremarkable and a negative pregnancy test.  She had a CAT scan that showed no acute abnormality in the abdomen pelvis.  The patient had some relief of her pain after 2 doses of pain medication.  She will be discharged and does have follow-up with her primary care physician tomorrow for reevaluation.       Medications   ondansetron (ZOFRAN) injection 4 mg (4 mg Intravenous Given 4/6/25 1402)   ketorolac (TORADOL) injection 15 mg (15 mg Intravenous Given 4/6/25 1352)   sodium chloride 0.9 % bolus 1,000 mL (0 mL Intravenous Stopped 4/6/25 1450)   morphine injection 4 mg (4 mg Intravenous Given 4/6/25 1422)       ED Risk Strat Scores                            SBIRT 22yo+      Flowsheet Row Most Recent Value   Initial Alcohol Screen: US AUDIT-C     1. How often do you have a drink containing alcohol? 0 Filed at: 04/06/2025 1313   2. How many drinks containing alcohol do you have on a typical day you are drinking?  0 Filed at: 04/06/2025 1313   3b.  FEMALE Any Age, or MALE 65+: How often do you have 4 or more drinks on one occassion? 0 Filed at: 04/06/2025 1313   Audit-C Score 0 Filed at: 04/06/2025 1313   VENECIA: How many times in the past year have you...    Used an illegal drug or used a prescription medication for non-medical reasons? Never Filed at: 04/06/2025 1313                            History of Present Illness       Chief Complaint   Patient presents with    Flank Pain     Patient c/o L sided flank pain that radiates into L groin. Pain started yesterday with nausea developing this AM. Attempted heating pad with minimal relief. No otc meds pta.        Past Medical History:   Diagnosis Date    COVID 01/2021    Kidney stone 01/2014    Kidney stones     started w/ kidney stones 1.5 years ago approx.      Past Surgical History:   Procedure Laterality Date    OH LITHOTRIPSY XTRCORP SHOCK WAVE Left 10/11/2016    Procedure: ESWL ;  Surgeon: Rolly Horton MD;  Location: [facility];  Service: Urology    OH OPEN TX PHALANGEAL SHAFT FRACTURE PROX/MIDDLE EA Left 3/9/2023    Procedure: CLOSED REDUCTION PERCUTANEOUS PINNING  LEFT SMALL  FINGER PROXIMAL PHALANX;  Surgeon: Fernando Bernal MD;  Location: [facility];  Service: Orthopedics    WISDOM TOOTH EXTRACTION        Family History   Problem Relation Age of Onset    Nephrolithiasis Mother     Lung disease Father     Breast cancer Paternal Grandmother       Social History     Tobacco Use    Smoking status: Former     Current packs/day: 0.00     Types: Cigarettes    Smokeless tobacco: Never   Vaping Use    Vaping status: Never Used   Substance Use Topics    Alcohol use: Not Currently     Comment: socially    Drug use: No      E-Cigarette/Vaping    E-Cigarette Use Never User       E-Cigarette/Vaping Substances      I have reviewed and agree with the history as documented.     This is a 29-year-old female presents to the emergency department complaining of left flank pain.  The patient states the pain  began yesterday.  She states that feels similar to her prior kidney stones and kidney infections.  She denies fevers.   she has nausea and vomiting.  She denies blood in her vomit.  She has had normal bowel movements.  She states that she feels like she has had frequency and urgency.  She denies chest pain or trouble breathing.        Review of Systems   All other systems reviewed and are negative.          Objective       ED Triage Vitals [04/06/25 1311]   Temperature Pulse Blood Pressure Respirations SpO2 Patient Position - Orthostatic VS   98.5 °F (36.9 °C) 100 130/96 16 100 % Sitting      Temp Source Heart Rate Source BP Location FiO2 (%) Pain Score    Oral Monitor Left arm -- 8      Vitals      Date and Time Temp Pulse SpO2 Resp BP Pain Score FACES Pain Rating User   04/06/25 1422 -- -- -- -- -- 8 -- JLT   04/06/25 1311 98.5 °F (36.9 °C) 100 100 % 16 130/96 8 -- DG            Physical Exam  Constitutional:  Vital signs reviewed, patient appears nontoxic, no acute distress  Eyes: Pupils equal round reactive to light and accommodation, extraocular muscles intact  HEENT: trachea midline, no JVD, moist mucous membranes  Respiratory: lung sounds clear throughout, no rhonchi, no rales  Cardiovascular: regular rate rhythm, no murmurs or rubs  Abdomen: soft, left lower quadrant tenderness, nondistended, no rebound or guarding  Back: Left-sided CVA tenderness, normal inspection  Extremities: no edema, pulses equal in all 4 extremities  Neuro: awake, alert, oriented, no focal weakness  Skin: warm, dry, intact, no rashes noted    Results Reviewed       Procedure Component Value Units Date/Time    Comprehensive metabolic panel [[id number]]  (Abnormal) Collected: 04/06/25 1349    Lab Status: Final result Specimen: Blood from Arm, Right Updated: 04/06/25 1408     Sodium 138 mmol/L      Potassium 4.1 mmol/L      Chloride 104 mmol/L      CO2 26 mmol/L      ANION GAP 8 mmol/L      BUN 7 mg/dL      Creatinine 0.75 mg/dL       Glucose 96 mg/dL      Calcium 9.4 mg/dL      AST 12 U/L      ALT 6 U/L      Alkaline Phosphatase 43 U/L      Total Protein 6.9 g/dL      Albumin 4.6 g/dL      Total Bilirubin 0.44 mg/dL      eGFR 108 ml/min/1.73sq m     Narrative:      National Kidney Disease Foundation guidelines for Chronic Kidney Disease (CKD):     Stage 1 with normal or high GFR (GFR > 90 mL/min/1.73 square meters)    Stage 2 Mild CKD (GFR = 60-89 mL/min/1.73 square meters)    Stage 3A Moderate CKD (GFR = 45-59 mL/min/1.73 square meters)    Stage 3B Moderate CKD (GFR = 30-44 mL/min/1.73 square meters)    Stage 4 Severe CKD (GFR = 15-29 mL/min/1.73 square meters)    Stage 5 End Stage CKD (GFR <15 mL/min/1.73 square meters)  Note: GFR calculation is accurate only with a steady state creatinine    Lipase [[id number]]  (Normal) Collected: 04/06/25 1349    Lab Status: Final result Specimen: Blood from Arm, Right Updated: 04/06/25 1408     Lipase 21 u/L     CBC and differential [[id number]]  (Abnormal) Collected: 04/06/25 1349    Lab Status: Final result Specimen: Blood from Arm, Right Updated: 04/06/25 1353     WBC 7.88 Thousand/uL      RBC 4.06 Million/uL      Hemoglobin 13.1 g/dL      Hematocrit 38.7 %      MCV 95 fL      MCH 32.3 pg      MCHC 33.9 g/dL      RDW 11.7 %      MPV 8.6 fL      Platelets 297 Thousands/uL      nRBC 0 /100 WBCs      Segmented % 62 %      Immature Grans % 0 %      Lymphocytes % 29 %      Monocytes % 8 %      Eosinophils Relative 1 %      Basophils Relative 0 %      Absolute Neutrophils 4.83 Thousands/µL      Absolute Immature Grans 0.01 Thousand/uL      Absolute Lymphocytes 2.32 Thousands/µL      Absolute Monocytes 0.60 Thousand/µL      Eosinophils Absolute 0.09 Thousand/µL      Basophils Absolute 0.03 Thousands/µL     POCT pregnancy, urine [[id number]]  (Normal) Collected: 04/06/25 1335    Lab Status: Final result Updated: 04/06/25 1335     EXT Preg Test, Ur Negative     Control Valid    UA (URINE) with reflex to Scope  [[id number]]  (Normal) Collected: 04/06/25 1314    Lab Status: Final result Specimen: Urine, Clean Catch Updated: 04/06/25 1320     Color, UA Yellow     Clarity, UA Clear     Specific Gravity, UA 1.010     pH, UA 7.0     Leukocytes, UA Negative     Nitrite, UA Negative     Protein, UA Negative mg/dl      Glucose, UA Negative mg/dl      Ketones, UA Negative mg/dl      Urobilinogen, UA 0.2 E.U./dl      Bilirubin, UA Negative     Occult Blood, UA Negative            CT renal stone study abdomen pelvis without contrast   Final Interpretation by Jose Alberto Van MD (04/06 1409)      Punctate bilateral nonobstructive nephrolithiasis without additional urinary calculi.         Workstation performed: [id number]             Procedures    ED Medication and Procedure Management   Prior to Admission Medications   Prescriptions Last Dose Informant Patient Reported? Taking?   Multiple Vitamins-Minerals (WOMENS MULTI VITAMIN & MINERAL PO)   Yes No   Sig: Take by mouth   ondansetron (ZOFRAN-ODT) 4 mg disintegrating tablet   No No   Sig: Take 1 tablet (4 mg total) by mouth every 8 (eight) hours as needed for nausea or vomiting for up to 7 days      Facility-Administered Medications: None     Discharge Medication List as of 4/6/2025  2:43 PM        CONTINUE these medications which have NOT CHANGED    Details   Multiple Vitamins-Minerals (WOMENS MULTI VITAMIN & MINERAL PO) Take by mouth, Historical Med      ondansetron (ZOFRAN-ODT) 4 mg disintegrating tablet Take 1 tablet (4 mg total) by mouth every 8 (eight) hours as needed for nausea or vomiting for up to 7 days, Starting Tue 1/14/2025, Until Tue 1/21/2025 at 2359, Normal           No discharge procedures on file.  ED SEPSIS DOCUMENTATION   Time reflects when diagnosis was documented in both MDM as applicable and the Disposition within this note       Time User Action Codes Description Comment    4/6/2025  2:42 PM Ld Petty Add [R10.9] Flank pain                  Ld Petty,  DO  04/06/25 1422       Ld Petty, DO  04/06/25 1519

## 2025-05-04 NOTE — ASSESSMENT & PLAN NOTE
Longstanding history of recurrent stone disease since childhood   Last intervention with [facility] left ESWL in 2016  CT April 2025: Punctate bilateral nonobstructing intrarenal calculi  Orders:    POCT urine dip auto non-scope    US kidney and bladder; Future

## 2025-05-04 NOTE — PROGRESS NOTES
Name: eZnia Dorado      : 1995      MRN: [id number]  Encounter Provider: Rolly Horton MD  Encounter Date: 2025   Encounter department: [facility]  :  Assessment & Plan  Nephrolithiasis  Longstanding history of recurrent stone disease since childhood   Last intervention with [facility] left ESWL in 2016  CT 2025: Punctate bilateral nonobstructing intrarenal calculi  Orders:    POCT urine dip auto non-scope    US kidney and bladder; Future      This is a very pleasant healthy 29-year-old female who had complex nephrolithiasis in late childhood.  I last saw the patient 10 years ago and perform left-sided ESWL    I reviewed her current stone burden.  Punctate left greater than right intrarenal stones.  Of note there is a questionable calcification in the left hemipelvis which was felt not to be a stone by the reading radiologist    Nevertheless the patient has ongoing symptoms of mild left renal colic    Plan is for further evaluation with a renal bladder ultrasound.  If the stone remains in place I will have the patient contacted and will arrange for left ureteroscopy.  If the stone is not in place we will follow things expectantly    I provided also information for pelvic floor muscle relaxation.      History of Present Illness   Zenia Dorado is a 29 y.o. female who presents for recurrent nephrolithiasis    Review of Systems   Constitutional:  Negative for activity change and fatigue.   HENT:  Negative for congestion.    Eyes:  Negative for visual disturbance.   Respiratory:  Negative for shortness of breath and wheezing.    Cardiovascular:  Negative for chest pain and leg swelling.   Gastrointestinal:  Negative for abdominal pain.   Endocrine: Negative for polyuria.   Genitourinary:  Positive for dysuria and flank pain. Negative for hematuria and urgency.   Musculoskeletal:  Negative for back pain.   Allergic/Immunologic: Negative for immunocompromised  "state.   Neurological:  Negative for dizziness and numbness.   Psychiatric/Behavioral:  Negative for dysphoric mood.    All other systems reviewed and are negative.         Objective   /72 (BP Location: Left arm, Patient Position: Sitting, Cuff Size: Standard)   Pulse (!) 109   Ht 5' 2\" (1.575 m)   Wt 54 kg (119 lb)   LMP 03/28/2025 (Approximate)   SpO2 99%   BMI 21.77 kg/m²     Physical Exam  Constitutional:       Appearance: She is well-developed.   HENT:      Head: Normocephalic and atraumatic.   Eyes:      Pupils: Pupils are equal, round, and reactive to light.   Pulmonary:      Effort: Pulmonary effort is normal.      Breath sounds: Normal breath sounds.   Abdominal:      Palpations: Abdomen is soft.   Genitourinary:     Vagina: Normal.   Musculoskeletal:         General: Normal range of motion.      Cervical back: Normal range of motion.   Skin:     General: Skin is warm.   Neurological:      Mental Status: She is alert and oriented to person, place, and time.          Results   No results found for: \"PSA\"  Lab Results   Component Value Date    GLUCOSE 91 06/12/2015    CALCIUM 9.4 04/06/2025     06/12/2015    K 4.1 04/06/2025    CO2 26 04/06/2025     04/06/2025    BUN 7 04/06/2025    CREATININE 0.75 04/06/2025     Lab Results   Component Value Date    WBC 7.88 04/06/2025    HGB 13.1 04/06/2025    HCT 38.7 04/06/2025    MCV 95 04/06/2025     04/06/2025       Office Urine Dip  Recent Results (from the past hour)   POCT urine dip auto non-scope    Collection Time: 05/05/25  3:27 PM   Result Value Ref Range     COLOR,UA yellow     CLARITY,UA clear     SPECIFIC GRAVITY,UA 1.025      PH,UA 8     LEUKOCYTE ESTERASE,UA 25     NITRITE,UA neg     GLUCOSE, UA norm     KETONES,UA neg     BILIRUBIN,UA neg     BLOOD,UA neg     POCT URINE PROTEIN neg     SL AMB POCT UROBILINOGEN norm      URINARY TRACT FINDINGS:     RIGHT KIDNEY AND URETER: Interpolar and right lower pole tiny nonobstructive " calculi. No hydronephrosis or hydroureter.     LEFT KIDNEY AND URETER: Multiple punctate nonobstructive calculi. No hydronephrosis or hydroureter.     URINARY BLADDER: Unremarkable.        ADDITIONAL FINDINGS:     LOWER CHEST: No clinically significant abnormality in the visualized lower chest.     SOLID VISCERA: Limited low radiation dose noncontrast CT evaluation demonstrates no clinically significant abnormality of the imaged portions of the liver, spleen, pancreas, or adrenal glands.     GALLBLADDER/BILIARY TREE: No calcified gallstones. No pericholecystic inflammatory change. No biliary dilation.     STOMACH AND BOWEL: Mild constipation.     APPENDIX: Normal.     ABDOMINOPELVIC CAVITY: No ascites. No pneumoperitoneum. No lymphadenopathy.     VESSELS: Unremarkable for patient's age.     REPRODUCTIVE ORGANS: Unremarkable for patient's age.     ABDOMINAL WALL/INGUINAL REGIONS: Unremarkable.     BONES: No acute fracture or suspicious osseous lesion.     IMPRESSION:     Punctate bilateral nonobstructive nephrolithiasis without additional urinary calculi.

## 2025-05-05 ENCOUNTER — OFFICE VISIT (OUTPATIENT)
Dept: UROLOGY | Facility: CLINIC | Age: 30
End: 2025-05-05
Payer: COMMERCIAL

## 2025-05-05 VITALS
HEIGHT: 62 IN | OXYGEN SATURATION: 99 % | HEART RATE: 109 BPM | BODY MASS INDEX: 21.9 KG/M2 | DIASTOLIC BLOOD PRESSURE: 72 MMHG | WEIGHT: 119 LBS | SYSTOLIC BLOOD PRESSURE: 114 MMHG

## 2025-05-05 DIAGNOSIS — N20.0 NEPHROLITHIASIS: Primary | ICD-10-CM

## 2025-05-05 LAB
SL AMB  POCT GLUCOSE, UA: NORMAL
SL AMB LEUKOCYTE ESTERASE,UA: 25
SL AMB POCT BILIRUBIN,UA: NORMAL
SL AMB POCT BLOOD,UA: NORMAL
SL AMB POCT CLARITY,UA: CLEAR
SL AMB POCT COLOR,UA: YELLOW
SL AMB POCT KETONES,UA: NORMAL
SL AMB POCT NITRITE,UA: NORMAL
SL AMB POCT PH,UA: 8
SL AMB POCT SPECIFIC GRAVITY,UA: 1.02
SL AMB POCT URINE PROTEIN: NORMAL
SL AMB POCT UROBILINOGEN: NORMAL

## 2025-05-05 PROCEDURE — 99203 OFFICE O/P NEW LOW 30 MIN: CPT | Performed by: UROLOGY

## 2025-05-05 PROCEDURE — 81003 URINALYSIS AUTO W/O SCOPE: CPT | Performed by: UROLOGY

## 2025-05-05 RX ORDER — MELOXICAM 15 MG/1
1 TABLET ORAL DAILY PRN
COMMUNITY
Start: 2025-02-13

## 2025-05-05 NOTE — PATIENT INSTRUCTIONS
Program for Pelvic Floor Relaxation   David Horton MD,PhD        The pelvic floor is a group of muscles that sits at the very bottom of your urinary and digestive tracts.  Although these muscles must stay toned to prevent urinary incontinence (or leakage), they must also relax in order for us to urinate comfortably.   If you are receiving this handout, your urology provider believes that your pelvic floor could use some help.           This part of your body is a common source of urinary difficulty including:     Weak urinary stream, needing to push or strain to empty your bladder     Urinary frequency or urgency     Pain or discomfort during or after urination     You have probably been holding these muscles “tight” for a long time.  When this happens, your brain accepts this as normal.  The first step is to become aware of the tense muscles.  Then you can begin your practice to relax these muscles.       Although the other organs in your urinary tract can be addressed with medications or surgery (such as the bladder, or prostate in men), pelvic floor relaxation requires a different plan.  This is all about the mind-body connection!  In addition, the pelvic floor can become excessively tight with stress.       Relaxing your pelvic floor involves 3 steps: (1) training your body to identify these muscles, (2) relaxation exercises, and (3) mindfulness practices for stress reduction.     Practice for 10-30 minutes daily (or twice daily if needed).  The more you practice, the better you will feel.  As you become more aware of the muscles and how tense they are, relaxation will start to become automatic.  Be patient and keep trying until you are able to relax easily and quickly.     Please visit my website for a collection of carefully selected videos and other resources to guide your pelvic floor relaxation practice!       [email or web address]      You can  also access this website using this QR code:          Relaxation Techniques:     Find the pelvic floor muscles. Learn to make them contract and relax. First, look at the diagram of the pelvic floor muscles. The muscles sit like a hammock inside your pelvis. In the front, they start at the pubic bone, surround the vagina or penis and rectum, and attach in the back to your coccyx (tailbone). Picture the muscles in your mind. Imagine the muscles relaxing, regina, and relaxing. Imagine how they would feel without pain or problems. Picture the muscles sagging down now, with your vagina or rectum (as a [place]) getting larger, and your sitting bones (the bones in your pelvis) .      Learn diaphragmatic breathing. This is a type of “deep breathing” to practice during all your pelvic relaxation exercises. First, take a slow, gentle breath in through your nose, and allow your belly and ribs to flare out to the sides. “Open” your pelvic floor with your inhale breath. Exhale slowly and gently through your mouth, allowing your belly to fall. Let the air out of your upper lungs, relax your ribs, belly and pelvic floor.     Try yoga!  Please visit my website above for an excellent youTube video on specific yoga exercises for pelvic floor relaxation.  Even if you have tried yoga before, this video describes easy/beginner yoga poses that specifically improve pelvic floor relaxation.     Incorporate mindfulness practices such as meditation.  Sit in a quiet room in a comfortable position.  Try a guided meditation on youtube, or the apps Calm or Headspace.  I recommend “body scan meditations” as particularly effective in aiding pelvic floor relaxation.      Relax your pelvic floor prior to urinating.  Once you have learned to target these muscles and relax them, make a habit of relaxing them before you urinate.  Being intentional to relax these muscles will allow you to urinate more comfortably.     Pay attention to  other areas of your body for signs of tension. Tension in other parts of your body may be making the tension in the pelvic floor muscles worse. Check various body parts (head, neck, shoulders, eyes, cheeks, jaw, arms, hands, legs, feet, ribs, belly, and buttocks) for tension. Now, gently release any area that feels tense. Allow yourself to entirely relax.       I hope you find this handout helpful!    Please consider taking a brief survey to help me learn how helpful these materials have been for you.               Rolly Horton MD,PhD   [facility]   [phone]

## 2025-05-09 ENCOUNTER — TELEPHONE (OUTPATIENT)
Age: 30
End: 2025-05-09

## 2025-05-09 DIAGNOSIS — R39.9 UTI SYMPTOMS: Primary | ICD-10-CM

## 2025-05-09 NOTE — TELEPHONE ENCOUNTER
Patient is looking to have Intermittent FMLA paperwork completed by her provider. This is for an ongoing issue, not surgery related FMLA.     It was recommended by her manager to cover time she might need off from work.     The forms were emailed to her. She has no way of being able to fax them to the office.     Is there anyway she can email them to someone?    Patient would appreciate a call back to let her know.

## 2025-05-12 ENCOUNTER — HOSPITAL ENCOUNTER (OUTPATIENT)
Dept: ULTRASOUND IMAGING | Facility: HOSPITAL | Age: 30
Discharge: HOME | End: 2025-05-12
Attending: UROLOGY
Payer: COMMERCIAL

## 2025-05-12 DIAGNOSIS — N20.0 NEPHROLITHIASIS: ICD-10-CM

## 2025-05-12 PROCEDURE — 76775 US EXAM ABDO BACK WALL LIM: CPT

## 2025-05-13 NOTE — TELEPHONE ENCOUNTER
Pt stopped by with [facility] paperwork to be completed. Pt asked completed paperwork to be faxed to [phone] Attn: Marco A Quinn.    Emailed to both Sathish Soliman and Porsche Martin.     Paperwork is scanned into media and attached to this encounter as a backup.

## 2025-05-13 NOTE — TELEPHONE ENCOUNTER
Spoke to pt to explain the [facility] paperwork process and pt plans to stop in today around 2pm to complete intake form and give paperwork to us.

## 2025-05-14 ENCOUNTER — NURSE TRIAGE (OUTPATIENT)
Age: 30
End: 2025-05-14

## 2025-05-14 NOTE — TELEPHONE ENCOUNTER
If having severe pain, nausea, vomiting, unable to eat, and lethargy, then should go to ER for evaluation

## 2025-05-14 NOTE — TELEPHONE ENCOUNTER
"FOLLOW UP: Patient has a known Left kidney stone    REASON FOR CONVERSATION: Flank Pain    SYMPTOMS: Left  continuous flank pain restarted last night 5/10    OTHER: Patient nauseous since last night and has not been able to eat all day, temp of 99.4     DISPOSITION: Go To ED    Patient was trying not to go to ED She was awaiting the results of her U/S of kidney and bladder.  Advised patient to go to ED now related to her sounding weak and unable to eat anything today in addition to her constant pain.    Reason for Disposition   Patient sounds very sick or weak to the triager    Answer Assessment - Initial Assessment Questions  1. LOCATION: \"Where does it hurt?\" (e.g., left, right)      Left side   2. ONSET: \"When did the pain start?\"      Last night- restarted  3. SEVERITY: \"How bad is the pain?\" (e.g., Scale 1-10; mild, moderate, or severe)      Moderate- 5/10  4. PATTERN: \"Does the pain come and go, or is it constant?\"       Constant   5. CAUSE: \"What do you think is causing the pain?\"      Stone pain  6. OTHER SYMPTOMS:  \"Do you have any other symptoms?\" (e.g., fever, abdomen pain, vomiting, leg weakness, burning with urination, blood in urine)      Nauseous since last night, unable to eat today  7. PREGNANCY:  \"Is there any chance you are pregnant?\" \"When was your last menstrual period?\"     no    Protocols used: Flank Pain-Adult-[place]    "

## 2025-05-15 ENCOUNTER — RESULTS FOLLOW-UP (OUTPATIENT)
Dept: UROLOGY | Facility: CLINIC | Age: 30
End: 2025-05-15

## 2025-05-15 ENCOUNTER — TELEPHONE (OUTPATIENT)
Dept: UROLOGY | Facility: CLINIC | Age: 30
End: 2025-05-15

## 2025-05-15 DIAGNOSIS — N20.0 NEPHROLITHIASIS: Primary | ICD-10-CM

## 2025-05-15 NOTE — TELEPHONE ENCOUNTER
Called and spoke with patient at this time in regards to US results response. She reports she is having flank pain that radiates to her groin, nausea, dark urine and feeling like she might have a UTI. She states the first urine of the day might be pink/red tinged. Reviewed for visible blood in her urine would recommended urine testing. Reviewed her recent in office testing did not show blood but we should repeat it officially if this is a new symptom. Orders placed and she knows to go to any [facility]'s lab for this. Reviewed r/t to US results, this may not be urologic in nature, typically small, non obstructing kidney stones do not cause issues. She states this is similar to how she felt with her last kidney stone issues. Reviewed per US results Dr. Horton is reassured and surgery not recommended.     Advised she have urine testing done and possible follow up with PCP. She states she may go to ER. I advised the office would call her once urine results are finalized. Advised she continue drinking mostly water.     Reviewed FMLA request. Advised as her last kidney stone issues was 10 years ago, and we just recently saw her again we could not write for intermittent FMLA without clinical data, her US was reassuring and non obstructing kidney stones typically do not cause issues. Patient reports her boss recommended the FMLA. Reviewed if she needed a stent surgery or had recurrent obstructing kidney stones we could wrote for some form of intermittent or continuous FMLA in the future. I offered to write a note for the office visit and US appt. She did not indicate she needed those notes. She verbalized understanding of conversation.     Clinical: please monitor for urine results.

## 2025-05-15 NOTE — TELEPHONE ENCOUNTER
Called and LM for pt. If she calls back, please relay message from Rebeca and ask if her symptoms have improved.

## 2025-05-15 NOTE — TELEPHONE ENCOUNTER
Patient was returning a call to the office. Per the previous encounter, relayed the message from the provider.     Patient states she is not feeling better. But she is trying to push through til the US results post. She stated Dr. Horton mentioned possible surgery. So, she is trying to save her days.     Can the office please keep an eye out for her results?

## 2025-05-15 NOTE — RESULT ENCOUNTER NOTE
Good news.  Your ultrasound confirms that there are no ureteral or bladder stones present.  Things look good from my standpoint.  If you have any persistent difficulties or worsening pain in the future, we should consider another CT scan    Please call with any questions in the meantime    Rolly Horton MD,PhD  [facility]  [phone]

## 2025-05-16 ENCOUNTER — APPOINTMENT (EMERGENCY)
Dept: ULTRASOUND IMAGING | Facility: HOSPITAL | Age: 30
End: 2025-05-16
Payer: COMMERCIAL

## 2025-05-16 ENCOUNTER — HOSPITAL ENCOUNTER (EMERGENCY)
Facility: HOSPITAL | Age: 30
Discharge: HOME | End: 2025-05-16
Attending: EMERGENCY MEDICINE
Payer: COMMERCIAL

## 2025-05-16 ENCOUNTER — APPOINTMENT (EMERGENCY)
Dept: CT IMAGING | Facility: HOSPITAL | Age: 30
End: 2025-05-16
Payer: COMMERCIAL

## 2025-05-16 VITALS
HEART RATE: 113 BPM | SYSTOLIC BLOOD PRESSURE: 134 MMHG | OXYGEN SATURATION: 100 % | RESPIRATION RATE: 18 BRPM | TEMPERATURE: 98.4 F | DIASTOLIC BLOOD PRESSURE: 91 MMHG

## 2025-05-16 DIAGNOSIS — R31.9 HEMATURIA: ICD-10-CM

## 2025-05-16 DIAGNOSIS — R10.9 LEFT FLANK PAIN: Primary | ICD-10-CM

## 2025-05-16 LAB
ALBUMIN SERPL BCG-MCNC: 4.4 G/DL (ref 3.5–5)
ALP SERPL-CCNC: 32 U/L (ref 34–104)
ALT SERPL W P-5'-P-CCNC: 6 U/L (ref 7–52)
ANION GAP SERPL CALCULATED.3IONS-SCNC: 7 MMOL/L (ref 4–13)
AST SERPL W P-5'-P-CCNC: 13 U/L (ref 13–39)
BACTERIA UR QL AUTO: NORMAL /HPF
BASOPHILS # BLD AUTO: 0.02 THOUSANDS/ÂΜL (ref 0–0.1)
BASOPHILS NFR BLD AUTO: 0 % (ref 0–1)
BILIRUB DIRECT SERPL-MCNC: 0.07 MG/DL (ref 0–0.2)
BILIRUB SERPL-MCNC: 0.47 MG/DL (ref 0.2–1)
BILIRUB UR QL STRIP: NEGATIVE
BUN SERPL-MCNC: 11 MG/DL (ref 5–25)
CALCIUM SERPL-MCNC: 9.1 MG/DL (ref 8.4–10.2)
CHLORIDE SERPL-SCNC: 105 MMOL/L (ref 96–108)
CLARITY UR: CLEAR
CO2 SERPL-SCNC: 23 MMOL/L (ref 21–32)
COLOR UR: ABNORMAL
CREAT SERPL-MCNC: 0.77 MG/DL (ref 0.6–1.3)
EOSINOPHIL # BLD AUTO: 0.08 THOUSAND/ÂΜL (ref 0–0.61)
EOSINOPHIL NFR BLD AUTO: 1 % (ref 0–6)
ERYTHROCYTE [DISTWIDTH] IN BLOOD BY AUTOMATED COUNT: 12.1 % (ref 11.6–15.1)
EXT PREGNANCY TEST URINE: NEGATIVE
EXT. CONTROL: NORMAL
GFR SERPL CREATININE-BSD FRML MDRD: 104 ML/MIN/1.73SQ M
GLUCOSE SERPL-MCNC: 97 MG/DL (ref 65–140)
GLUCOSE UR STRIP-MCNC: NEGATIVE MG/DL
HCT VFR BLD AUTO: 39 % (ref 34.8–46.1)
HGB BLD-MCNC: 13 G/DL (ref 11.5–15.4)
HGB UR QL STRIP.AUTO: ABNORMAL
IMM GRANULOCYTES # BLD AUTO: 0.01 THOUSAND/UL (ref 0–0.2)
IMM GRANULOCYTES NFR BLD AUTO: 0 % (ref 0–2)
KETONES UR STRIP-MCNC: ABNORMAL MG/DL
LEUKOCYTE ESTERASE UR QL STRIP: NEGATIVE
LIPASE SERPL-CCNC: 19 U/L (ref 11–82)
LYMPHOCYTES # BLD AUTO: 2.57 THOUSANDS/ÂΜL (ref 0.6–4.47)
LYMPHOCYTES NFR BLD AUTO: 37 % (ref 14–44)
MCH RBC QN AUTO: 31.9 PG (ref 26.8–34.3)
MCHC RBC AUTO-ENTMCNC: 33.3 G/DL (ref 31.4–37.4)
MCV RBC AUTO: 96 FL (ref 82–98)
MONOCYTES # BLD AUTO: 0.54 THOUSAND/ÂΜL (ref 0.17–1.22)
MONOCYTES NFR BLD AUTO: 8 % (ref 4–12)
NEUTROPHILS # BLD AUTO: 3.73 THOUSANDS/ÂΜL (ref 1.85–7.62)
NEUTS SEG NFR BLD AUTO: 54 % (ref 43–75)
NITRITE UR QL STRIP: NEGATIVE
NON-SQ EPI CELLS URNS QL MICRO: NORMAL /HPF
NRBC BLD AUTO-RTO: 0 /100 WBCS
PH UR STRIP.AUTO: 6 [PH]
PLATELET # BLD AUTO: 263 THOUSANDS/UL (ref 149–390)
PMV BLD AUTO: 8.6 FL (ref 8.9–12.7)
POTASSIUM SERPL-SCNC: 4.6 MMOL/L (ref 3.5–5.3)
PROT SERPL-MCNC: 6.7 G/DL (ref 6.4–8.4)
PROT UR STRIP-MCNC: NEGATIVE MG/DL
RBC # BLD AUTO: 4.07 MILLION/UL (ref 3.81–5.12)
RBC #/AREA URNS AUTO: NORMAL /HPF
SODIUM SERPL-SCNC: 135 MMOL/L (ref 135–147)
SP GR UR STRIP.AUTO: 1.01 (ref 1–1.03)
UROBILINOGEN UR STRIP-ACNC: <2 MG/DL
WBC # BLD AUTO: 6.95 THOUSAND/UL (ref 4.31–10.16)
WBC #/AREA URNS AUTO: NORMAL /HPF

## 2025-05-16 PROCEDURE — 76830 TRANSVAGINAL US NON-OB: CPT

## 2025-05-16 PROCEDURE — 81025 URINE PREGNANCY TEST: CPT

## 2025-05-16 PROCEDURE — 99284 EMERGENCY DEPT VISIT MOD MDM: CPT

## 2025-05-16 PROCEDURE — 36415 COLL VENOUS BLD VENIPUNCTURE: CPT

## 2025-05-16 PROCEDURE — 99285 EMERGENCY DEPT VISIT HI MDM: CPT | Performed by: EMERGENCY MEDICINE

## 2025-05-16 PROCEDURE — 96375 TX/PRO/DX INJ NEW DRUG ADDON: CPT

## 2025-05-16 PROCEDURE — 85025 COMPLETE CBC W/AUTO DIFF WBC: CPT

## 2025-05-16 PROCEDURE — 96361 HYDRATE IV INFUSION ADD-ON: CPT

## 2025-05-16 PROCEDURE — 81001 URINALYSIS AUTO W/SCOPE: CPT

## 2025-05-16 PROCEDURE — 83690 ASSAY OF LIPASE: CPT

## 2025-05-16 PROCEDURE — 80076 HEPATIC FUNCTION PANEL: CPT

## 2025-05-16 PROCEDURE — 74177 CT ABD & PELVIS W/CONTRAST: CPT

## 2025-05-16 PROCEDURE — 76856 US EXAM PELVIC COMPLETE: CPT

## 2025-05-16 PROCEDURE — 96374 THER/PROPH/DIAG INJ IV PUSH: CPT

## 2025-05-16 PROCEDURE — 96376 TX/PRO/DX INJ SAME DRUG ADON: CPT

## 2025-05-16 PROCEDURE — 80048 BASIC METABOLIC PNL TOTAL CA: CPT

## 2025-05-16 RX ORDER — KETOROLAC TROMETHAMINE 10 MG/1
10 TABLET, FILM COATED ORAL EVERY 6 HOURS PRN
Qty: 20 TABLET | Refills: 0 | Status: SHIPPED | OUTPATIENT
Start: 2025-05-16 | End: 2025-05-22

## 2025-05-16 RX ORDER — ONDANSETRON 2 MG/ML
4 INJECTION INTRAMUSCULAR; INTRAVENOUS ONCE
Status: COMPLETED | OUTPATIENT
Start: 2025-05-16 | End: 2025-05-16

## 2025-05-16 RX ORDER — ACETAMINOPHEN 325 MG/1
975 TABLET ORAL ONCE
Status: COMPLETED | OUTPATIENT
Start: 2025-05-16 | End: 2025-05-16

## 2025-05-16 RX ORDER — KETOROLAC TROMETHAMINE 10 MG/1
10 TABLET, FILM COATED ORAL EVERY 6 HOURS PRN
Qty: 20 TABLET | Refills: 0 | Status: SHIPPED | OUTPATIENT
Start: 2025-05-16 | End: 2025-05-16

## 2025-05-16 RX ORDER — HYDROMORPHONE HCL/PF 1 MG/ML
0.5 SYRINGE (ML) INJECTION ONCE
Status: COMPLETED | OUTPATIENT
Start: 2025-05-16 | End: 2025-05-16

## 2025-05-16 RX ORDER — KETOROLAC TROMETHAMINE 30 MG/ML
15 INJECTION, SOLUTION INTRAMUSCULAR; INTRAVENOUS ONCE
Status: COMPLETED | OUTPATIENT
Start: 2025-05-16 | End: 2025-05-16

## 2025-05-16 RX ADMIN — IOHEXOL 85 ML: 350 INJECTION, SOLUTION INTRAVENOUS at 11:36

## 2025-05-16 RX ADMIN — SODIUM CHLORIDE 1000 ML: 0.9 INJECTION, SOLUTION INTRAVENOUS at 11:16

## 2025-05-16 RX ADMIN — HYDROMORPHONE HYDROCHLORIDE 0.5 MG: 1 INJECTION, SOLUTION INTRAMUSCULAR; INTRAVENOUS; SUBCUTANEOUS at 12:30

## 2025-05-16 RX ADMIN — HYDROMORPHONE HYDROCHLORIDE 0.5 MG: 1 INJECTION, SOLUTION INTRAMUSCULAR; INTRAVENOUS; SUBCUTANEOUS at 11:16

## 2025-05-16 RX ADMIN — KETOROLAC TROMETHAMINE 15 MG: 30 INJECTION, SOLUTION INTRAMUSCULAR; INTRAVENOUS at 10:36

## 2025-05-16 RX ADMIN — ACETAMINOPHEN 975 MG: 325 TABLET ORAL at 10:32

## 2025-05-16 RX ADMIN — ONDANSETRON 4 MG: 2 INJECTION INTRAMUSCULAR; INTRAVENOUS at 11:16

## 2025-05-16 NOTE — ED PROVIDER NOTES
Time reflects when diagnosis was documented in both MDM as applicable and the Disposition within this note       Time User Action Codes Description Comment    5/16/2025  2:06 PM Jacoby Chioma Geovanna Add [R10.9] Left flank pain     5/16/2025  2:06 PM Chioma Dozier Add [R31.9] Hematuria           ED Disposition       ED Disposition   Discharge    Condition   Stable    Date/Time   Fri May 16, 2025  2:07 PM    Comment   Zenia Dorado discharge to home/self care.                   Assessment & Plan       Medical Decision Making  29-year-old female with history of nephrolithiasis and medullary sponge kidney presents to the emergency department for evaluation of worsening left flank pain over the course of 1 month, specifically worse in the last 3 days.  States that she has had kidney stones in the past, worst from the ages of [age], which she required lithotripsy.  She states for the last month that she has been having left flank pain that radiates down into her left groin associated with nausea and dry heaving.  She did follow-up with her urologist, she obtained an outpatient renal ultrasound yesterday that shows stones in the pelvis of the kidney but not in the ureters no obstruction.  She is in a significant amount of pain.    Here in the ED, the patient is hemodynamically stable and afebrile, mildly tachycardic, normal work of breathing satting 100% on room air.  She is alert and oriented x 3.  She is tearful, uncomfortable due to pain.  She has left flank pain on exam that radiates down into the left lower pelvis.    Differential diagnosis includes but is not limited to: Pyelonephritis versus UTI, kidney stone, renal colic, ovarian torsion    UA here shows hematuria without evidence of magda infection, and CT did not show any evidence of an obstructing stone although it does show renal pelvis calculi.  Ovarian ultrasound does not show any evidence of torsion.  Patient does require multiple doses of pain  medication.  Discussed the results with the patient, she is understandably frustrated that imaging workup did not provide an answer given that she is in a significant amount of pain.  Encouraged her to call urology office in the morning to discuss next steps.  Discussed strict return precautions.  Discharged in stable condition.    Amount and/or Complexity of Data Reviewed  Labs: ordered. Decision-making details documented in ED Course.  Radiology: ordered.    Risk  OTC drugs.  Prescription drug management.        ED Course as of 05/18/25 2054   Fri May 16, 2025   1117 PREGNANCY TEST URINE: Negative   1215 Patient reporting pain coming back   1217 CT is completed, waiting on formal read   1229 IMPRESSION:     No acute inflammatory process identified.     Stable bilateral nonobstructing intrarenal calculi without hydronephrosis.     1239 CT of the abdomen pelvis with stable bilateral nonobstructing intrarenal calculi without hydronephrosis.  No other findings to suggest patient's acute left flank pain.  Still waiting on ultrasound of the pelvis.       Medications   acetaminophen (TYLENOL) tablet 975 mg (975 mg Oral Given 5/16/25 1032)   ketorolac (TORADOL) injection 15 mg (15 mg Intravenous Given 5/16/25 1036)   ondansetron (ZOFRAN) injection 4 mg (4 mg Intravenous Given 5/16/25 1116)   sodium chloride 0.9 % bolus 1,000 mL (0 mL Intravenous Stopped 5/16/25 1216)   HYDROmorphone (DILAUDID) injection 0.5 mg (0.5 mg Intravenous Given 5/16/25 1116)   iohexol (OMNIPAQUE) 350 MG/ML injection (MULTI-DOSE) 85 mL (85 mL Intravenous Given 5/16/25 1136)   HYDROmorphone (DILAUDID) injection 0.5 mg (0.5 mg Intravenous Given 5/16/25 1230)       ED Risk Strat Scores                    No data recorded        SBIRT 20yo+      Flowsheet Row Most Recent Value   Initial Alcohol Screen: US AUDIT-C     1. How often do you have a drink containing alcohol? 0 Filed at: 05/16/2025 1015   2. How many drinks containing alcohol do you have on a  typical day you are drinking?  0 Filed at: 05/16/2025 1015   3b. FEMALE Any Age, or MALE 65+: How often do you have 4 or more drinks on one occassion? 0 Filed at: 05/16/2025 1015   Audit-C Score 0 Filed at: 05/16/2025 1015   VENECIA: How many times in the past year have you...    Used an illegal drug or used a prescription medication for non-medical reasons? Never Filed at: 05/16/2025 1015                            History of Present Illness       Chief Complaint   Patient presents with    Flank Pain     Pt complains of left flank pain and UTI symptoms for about a month. Reports having bilateral kidney stones per US she had on Monday. +nausea       Past Medical History:   Diagnosis Date    COVID 01/2021    Kidney stone 01/2014    Kidney stones     started w/ kidney stones 1.5 years ago approx.      Past Surgical History:   Procedure Laterality Date    AR LITHOTRIPSY XTRCORP SHOCK WAVE Left 10/11/2016    Procedure: ESWL ;  Surgeon: Rolly Horton MD;  Location: [facility];  Service: Urology    AR OPEN TX PHALANGEAL SHAFT FRACTURE PROX/MIDDLE EA Left 3/9/2023    Procedure: CLOSED REDUCTION PERCUTANEOUS PINNING  LEFT SMALL  FINGER PROXIMAL PHALANX;  Surgeon: Fernando Bernal MD;  Location: [facility];  Service: Orthopedics    WISDOM TOOTH EXTRACTION        Family History   Problem Relation Age of Onset    Nephrolithiasis Mother     Lung disease Father     Breast cancer Paternal Grandmother       Social History[1]   E-Cigarette/Vaping    E-Cigarette Use Never User       E-Cigarette/Vaping Substances      I have reviewed and agree with the history as documented.     29-year-old female with history of nephrolithiasis and medullary sponge kidney presents to the emergency department for evaluation of worsening left flank pain over the course of 1 month, specifically worse in the last 3 days.  States that she has had kidney stones in the past, worst from the ages of [age], which she required lithotripsy.  She  states for the last month that she has been having left flank pain that radiates down into her left groin associated with nausea and dry heaving.  She did follow-up with her urologist, she obtained an outpatient renal ultrasound yesterday that shows stones in the pelvis of the kidney but not in the ureters no obstruction.  She is in a significant amount of pain.          Review of Systems   Constitutional:  Negative for chills and fever.   HENT:  Negative for ear pain and sore throat.    Eyes:  Negative for visual disturbance.   Respiratory:  Negative for cough and shortness of breath.    Cardiovascular:  Negative for chest pain and leg swelling.   Gastrointestinal:  Positive for nausea and vomiting. Negative for abdominal pain, blood in stool, constipation and diarrhea.   Genitourinary:  Positive for flank pain. Negative for dysuria and hematuria.   Musculoskeletal:  Negative for neck pain and neck stiffness.   Neurological:  Negative for dizziness, syncope, weakness and light-headedness.   All other systems reviewed and are negative.          Objective       ED Triage Vitals [05/16/25 1004]   Temperature Pulse Blood Pressure Respirations SpO2 Patient Position - Orthostatic VS   98.4 °F (36.9 °C) (!) 113 134/91 18 100 % Sitting      Temp Source Heart Rate Source BP Location FiO2 (%) Pain Score    Oral Monitor Left arm -- 7      Vitals      Date and Time Temp Pulse SpO2 Resp BP Pain Score FACES Pain Rating User   05/16/25 1230 -- -- -- -- -- 7 -- AD   05/16/25 1116 -- -- -- -- -- 6 -- AD   05/16/25 1036 -- -- -- -- -- 8 --    05/16/25 1004 98.4 °F (36.9 °C) 113 100 % 18 134/91 7 -- TB            Physical Exam  Vitals and nursing note reviewed.   Constitutional:       General: She is not in acute distress.     Appearance: She is well-developed. She is not toxic-appearing or diaphoretic.      Comments: tearful   HENT:      Head: Normocephalic and atraumatic.     Eyes:      Extraocular Movements: Extraocular  movements intact.      Conjunctiva/sclera: Conjunctivae normal.       Cardiovascular:      Rate and Rhythm: Regular rhythm. Tachycardia present.      Pulses: Normal pulses.      Heart sounds: Normal heart sounds. No murmur heard.  Pulmonary:      Effort: Pulmonary effort is normal. No respiratory distress.      Breath sounds: Normal breath sounds. No wheezing or rales.   Abdominal:      General: There is no distension.      Palpations: Abdomen is soft. There is no mass.      Tenderness: There is abdominal tenderness (left lowet quadrant/pelvis). There is left CVA tenderness. There is no guarding or rebound.      Hernia: No hernia is present.     Musculoskeletal:         General: No swelling.      Cervical back: Normal range of motion and neck supple. No rigidity or tenderness.     Skin:     General: Skin is warm and dry.      Capillary Refill: Capillary refill takes less than 2 seconds.      Findings: No rash.     Neurological:      General: No focal deficit present.      Mental Status: She is alert and oriented to person, place, and time.     Psychiatric:         Mood and Affect: Mood normal.         Results Reviewed       Procedure Component Value Units Date/Time    Basic metabolic panel [[id number]] Collected: 05/16/25 1036    Lab Status: Final result Specimen: Blood from Arm, Right Updated: 05/16/25 1110     Sodium 135 mmol/L      Potassium 4.6 mmol/L      Chloride 105 mmol/L      CO2 23 mmol/L      ANION GAP 7 mmol/L      BUN 11 mg/dL      Creatinine 0.77 mg/dL      Glucose 97 mg/dL      Calcium 9.1 mg/dL      eGFR 104 ml/min/1.73sq m     Narrative:      National Kidney Disease Foundation guidelines for Chronic Kidney Disease (CKD):     Stage 1 with normal or high GFR (GFR > 90 mL/min/1.73 square meters)    Stage 2 Mild CKD (GFR = 60-89 mL/min/1.73 square meters)    Stage 3A Moderate CKD (GFR = 45-59 mL/min/1.73 square meters)    Stage 3B Moderate CKD (GFR = 30-44 mL/min/1.73 square meters)    Stage 4 Severe CKD  (GFR = 15-29 mL/min/1.73 square meters)    Stage 5 End Stage CKD (GFR <15 mL/min/1.73 square meters)  Note: GFR calculation is accurate only with a steady state creatinine    Hepatic function panel [[id number]]  (Abnormal) Collected: 05/16/25 1036    Lab Status: Final result Specimen: Blood from Arm, Right Updated: 05/16/25 1110     Total Bilirubin 0.47 mg/dL      Bilirubin, Direct 0.07 mg/dL      Alkaline Phosphatase 32 U/L      AST 13 U/L      ALT 6 U/L      Total Protein 6.7 g/dL      Albumin 4.4 g/dL     Lipase [[id number]]  (Normal) Collected: 05/16/25 1036    Lab Status: Final result Specimen: Blood from Arm, Right Updated: 05/16/25 1110     Lipase 19 u/L     Urine Microscopic [[id number]]  (Normal) Collected: 05/16/25 1036    Lab Status: Final result Specimen: Urine, Clean Catch Updated: 05/16/25 1052     RBC, UA 1-2 /hpf      WBC, UA None Seen /hpf      Epithelial Cells Occasional /hpf      Bacteria, UA Occasional /hpf     UA w Reflex to Microscopic w Reflex to Culture [[id number]]  (Abnormal) Collected: 05/16/25 1036    Lab Status: Final result Specimen: Urine, Clean Catch Updated: 05/16/25 1049     Color, UA Light Yellow     Clarity, UA Clear     Specific Gravity, UA 1.014     pH, UA 6.0     Leukocytes, UA Negative     Nitrite, UA Negative     Protein, UA Negative mg/dl      Glucose, UA Negative mg/dl      Ketones, UA Trace mg/dl      Urobilinogen, UA <2.0 mg/dl      Bilirubin, UA Negative     Occult Blood, UA Trace    CBC and differential [[id number]]  (Abnormal) Collected: 05/16/25 1036    Lab Status: Final result Specimen: Blood from Arm, Right Updated: 05/16/25 1049     WBC 6.95 Thousand/uL      RBC 4.07 Million/uL      Hemoglobin 13.0 g/dL      Hematocrit 39.0 %      MCV 96 fL      MCH 31.9 pg      MCHC 33.3 g/dL      RDW 12.1 %      MPV 8.6 fL      Platelets 263 Thousands/uL      nRBC 0 /100 WBCs      Segmented % 54 %      Immature Grans % 0 %      Lymphocytes % 37 %      Monocytes % 8 %      Eosinophils  Relative 1 %      Basophils Relative 0 %      Absolute Neutrophils 3.73 Thousands/µL      Absolute Immature Grans 0.01 Thousand/uL      Absolute Lymphocytes 2.57 Thousands/µL      Absolute Monocytes 0.54 Thousand/µL      Eosinophils Absolute 0.08 Thousand/µL      Basophils Absolute 0.02 Thousands/µL     POCT pregnancy, urine [[id number]]  (Normal) Collected: 05/16/25 1036    Lab Status: Final result Updated: 05/16/25 1036     EXT Preg Test, Ur Negative     Control Valid            US pelvis complete w transvaginal   Final Interpretation by Janes Hawley MD (05/16 6134)      Probable corpus luteum on the right.      The left ovary appears unremarkable.      Normal Doppler flow is noted to both ovaries at this time. Of note, intermittent torsion can occur although lack of ovarian enlargement makes this less likely. Findings should be correlated with clinical signs and symptomatology.      Small amount of free fluid.         This was communicated with Dr. Darling via secure text at 1:44 p.m.                        Workstation performed: [id number]         CT abdomen pelvis with contrast   Final Interpretation by Adrian Bellamy MD (05/16 5058)      No acute inflammatory process identified.      Stable bilateral nonobstructing intrarenal calculi without hydronephrosis.         Workstation performed: [id number]             Procedures    ED Medication and Procedure Management   Prior to Admission Medications   Prescriptions Last Dose Informant Patient Reported? Taking?   Multiple Vitamins-Minerals (WOMENS MULTI VITAMIN & MINERAL PO)  Self Yes No   Sig: Take by mouth   meloxicam (MOBIC) 15 mg tablet  Self Yes No   Sig: Take 1 tablet by mouth daily as needed for moderate pain   ondansetron (ZOFRAN-ODT) 4 mg disintegrating tablet   No No   Sig: Take 1 tablet (4 mg total) by mouth every 8 (eight) hours as needed for nausea or vomiting for up to 7 days   Patient not taking: Reported on 5/5/2025      Facility-Administered  Medications: None     Discharge Medication List as of 5/16/2025  2:08 PM        START taking these medications    Details   ketorolac (TORADOL) 10 mg tablet Take 1 tablet (10 mg total) by mouth every 6 (six) hours as needed for moderate pain for up to 5 days, Starting Fri 5/16/2025, Until Wed 5/21/2025 at 2359, Print           CONTINUE these medications which have NOT CHANGED    Details   meloxicam (MOBIC) 15 mg tablet Take 1 tablet by mouth daily as needed for moderate pain, Starting Thu 2/13/2025, Historical Med      Multiple Vitamins-Minerals (WOMENS MULTI VITAMIN & MINERAL PO) Take by mouth, Historical Med      ondansetron (ZOFRAN-ODT) 4 mg disintegrating tablet Take 1 tablet (4 mg total) by mouth every 8 (eight) hours as needed for nausea or vomiting for up to 7 days, Starting Tue 1/14/2025, Until Tue 1/21/2025 at 2359, Normal           No discharge procedures on file.  ED SEPSIS DOCUMENTATION   Time reflects when diagnosis was documented in both MDM as applicable and the Disposition within this note       Time User Action Codes Description Comment    5/16/2025  2:06 PM Chioma Dozier [R10.9] Left flank pain     5/16/2025  2:06 PM Chioma Dozier [R31.9] Hematuria                    [1]   Social History  Tobacco Use    Smoking status: Former     Current packs/day: 0.00     Types: Cigarettes    Smokeless tobacco: Never   Vaping Use    Vaping status: Never Used   Substance Use Topics    Alcohol use: Not Currently     Comment: socially    Drug use: No        Chioma Dozier MD  05/18/25 2054

## 2025-05-16 NOTE — ED ATTENDING ATTESTATION
5/16/2025  IRiddhi MD, saw and evaluated the patient. I have discussed the patient with the resident/non-physician practitioner and agree with the resident's/non-physician practitioner's findings, Plan of Care, and MDM as documented in the resident's/non-physician practitioner's note, except where noted. All available labs and Radiology studies were reviewed.  I was present for key portions of any procedure(s) performed by the resident/non-physician practitioner and I was immediately available to provide assistance.       At this point I agree with the current assessment done in the Emergency Department.  I have conducted an independent evaluation of this patient a history and physical is as follows:    29-year-old female with a history of nephrolithiasis presenting for evaluation of flank pain and dysuria.  Patient states her symptoms have been ongoing for about a month.  Saw her PCP and urologist who sent her for an ultrasound.  Urology also ordered lab work and a CT scan but patient was unable to obtain this because her symptoms were too severe.  Presents here today for worsening pain, nausea, and dry heaving.  States over-the-counter acetaminophen and ibuprofen is not relieving her pain.  She denies associated fever, chills, upper respiratory symptoms, chest pain, shortness of breath, diarrhea, vaginal bleeding or discharge.    Please see resident documentation for histories review of systems.    Exam: Vital signs nursing notes reviewed  General: Awake, alert, uncomfortable in appearance holding left side  HEENT: Normocephalic, atraumatic, mucous membranes moist  Neck: Supple  Heart: Regular rate and rhythm  Lungs: Clear to auscultation bilaterally without wheezes, rales, or rhonchi  Abdomen: Soft, nondistended, left lower quadrant tenderness to palpation with guarding but no rebound, left CVA tenderness, no right CVA tenderness  Extremities: No deformity  Skin: Warm, dry, intact, no  rash    ED Course  ED Course as of 25 1446   Fri May 16, 2025   1041 PREGNANCY TEST URINE: Negative   1051 CBC and differential(!)  Grossly normal   1052 Leukocytes, UA: Negative   1052 Nitrite, UA: Negative   1052 Blood, UA(!): Trace   1057 Bacteria, UA: Occasional   1057 Epithelial Cells: Occasional   1057 WBC, UA: None Seen   1112 LIPASE: 19   1113 Basic metabolic panel  Grossly normal   1113 Hepatic function panel(!)  Grossly normal   1233 CT abdomen pelvis with contrast  IMPRESSION:     No acute inflammatory process identified.     Stable bilateral nonobstructing intrarenal calculi without hydronephrosis.     1403 US pelvis complete w transvaginal  IMPRESSION:     Probable corpus luteum on the right.     The left ovary appears unremarkable.     Normal Doppler flow is noted to both ovaries at this time. Of note, intermittent torsion can occur although lack of ovarian enlargement makes this less likely. Findings should be correlated with clinical signs and symptomatology.     Small amount of free fluid.       ED course/Medical Decision Makin-year-old female presenting for evaluation of flank pain and dysuria.  Differential diagnosis includes gastritis, pancreatitis, hepatitis, appendicitis, diverticulitis, nephrolithiasis, pyelonephritis, ectopic pregnancy, cyst rupture, torsion.  CBC, CMP, and lipase are grossly normal.  There is no evidence of urinary tract infection with trace of blood on urinalysis.  Pregnancy test is negative.  CT of the abdomen/pelvis shows no acute inflammatory processes, and stable bilateral nonobstructing intrarenal calculi without hydronephrosis are noted.  Ultrasound of the pelvis shows an unremarkable left ovary with normal Doppler flow.  Patient's pain was treated supportively with multimodal pain control, antiemetics, and hydration in the emergency department.  Unclear as to the exact etiology of the patient's symptoms.  May be experiencing renal colic but low suspicion  for pyelonephritis or acute surgical etiology of her pain at this time.  In discussion with the patient, she is agreeable to discharge home at this time with close outpatient follow-up with PCP, OB/GYN, and urology.  Counseled on continued supportive measures at home in addition to return precautions for worsening pain, fever, vomiting, or any new symptoms.  Patient is in agreement and understanding of these instructions.    Diagnosis: Flank pain  Disposition: Discharge

## 2025-05-16 NOTE — DISCHARGE INSTRUCTIONS
When do I need to get emergency help?   Call for an ambulance right away if:   Your flank pain becomes severe, especially if this happens suddenly.  Your flank pain becomes severe and moves down your back or into your belly.  You have trouble breathing or start having chest pain along with your flank pain.  You start throwing up blood or pass a lot of blood in your stool.  Your belly becomes very hard or swollen.  Return to the ED if:   You get a fever of 102.2°F (39°C) or higher or shaking chills.  You have signs of severe fluid loss, such as:  No urine for more than 8 hours.  You feel very light-headed or like you are going to pass out.  You feel weak like you are going to fall.  Your pain gets worse, comes more often, or moves to one area of the belly.  You have an upset stomach or throwing up that isn’t getting better and are having trouble keeping down food and drink.  Your stools are black or tar-colored.

## 2025-05-16 NOTE — Clinical Note
Zenia Dorado was seen and treated in our emergency department on 5/16/2025.                Diagnosis:     Zenia  may return to work on return date, is off the rest of the shift today.    She may return on this date: 05/19/2025         If you have any questions or concerns, please don't hesitate to call.      Chioma Dozier MD    ______________________________           _______________          _______________  Hospital Representative                              Date                                Time

## 2025-05-19 NOTE — TELEPHONE ENCOUNTER
Patient calling to say she is still having a lot of pain in her left kidney and continues with blood in her urine.  She had an US and a CT scan on 5/16 while she was at the ED.  She states she thinks she still has the stone.    Please advise

## 2025-05-19 NOTE — TELEPHONE ENCOUNTER
CT was negative other than tiny non-obstructing intrarenal stones. Please schedule office visit to discuss these findings in person. Thanks

## 2025-05-20 NOTE — TELEPHONE ENCOUNTER
Per this encounter and results follow up encounter from 5/15 it was recommended patient come in for follow up with [facility] to discuss continued symptoms.       Appt scheduled please call to confirm

## 2025-05-20 NOTE — TELEPHONE ENCOUNTER
LVM for pt. Asked how she was feeling. Per clinical, requested CB to confirm 5/22/25 appt at [phone]

## 2025-05-22 ENCOUNTER — OFFICE VISIT (OUTPATIENT)
Dept: UROLOGY | Facility: CLINIC | Age: 30
End: 2025-05-22
Payer: COMMERCIAL

## 2025-05-22 VITALS
HEART RATE: 101 BPM | OXYGEN SATURATION: 99 % | SYSTOLIC BLOOD PRESSURE: 108 MMHG | DIASTOLIC BLOOD PRESSURE: 72 MMHG | HEIGHT: 62 IN | WEIGHT: 120 LBS | BODY MASS INDEX: 22.08 KG/M2

## 2025-05-22 DIAGNOSIS — N20.0 NEPHROLITHIASIS: ICD-10-CM

## 2025-05-22 DIAGNOSIS — N39.8 VOIDING DYSFUNCTION: ICD-10-CM

## 2025-05-22 DIAGNOSIS — N13.30 HYDRONEPHROSIS, UNSPECIFIED HYDRONEPHROSIS TYPE: Primary | ICD-10-CM

## 2025-05-22 LAB
POST-VOID RESIDUAL VOLUME, ML POC: 88 ML
SL AMB  POCT GLUCOSE, UA: NEGATIVE
SL AMB LEUKOCYTE ESTERASE,UA: NEGATIVE
SL AMB POCT BILIRUBIN,UA: NEGATIVE
SL AMB POCT BLOOD,UA: NEGATIVE
SL AMB POCT CLARITY,UA: CLEAR
SL AMB POCT COLOR,UA: YELLOW
SL AMB POCT KETONES,UA: NEGATIVE
SL AMB POCT NITRITE,UA: NEGATIVE
SL AMB POCT PH,UA: 7
SL AMB POCT SPECIFIC GRAVITY,UA: 1
SL AMB POCT URINE PROTEIN: NEGATIVE
SL AMB POCT UROBILINOGEN: 0.2

## 2025-05-22 PROCEDURE — 51798 US URINE CAPACITY MEASURE: CPT | Performed by: PHYSICIAN ASSISTANT

## 2025-05-22 PROCEDURE — 81003 URINALYSIS AUTO W/O SCOPE: CPT | Performed by: PHYSICIAN ASSISTANT

## 2025-05-22 PROCEDURE — 99213 OFFICE O/P EST LOW 20 MIN: CPT | Performed by: PHYSICIAN ASSISTANT

## 2025-05-22 RX ORDER — TAMSULOSIN HYDROCHLORIDE 0.4 MG/1
0.4 CAPSULE ORAL
Qty: 15 CAPSULE | Refills: 5 | Status: SHIPPED | OUTPATIENT
Start: 2025-05-22

## 2025-05-22 RX ORDER — OXYCODONE HYDROCHLORIDE 5 MG/1
5 TABLET ORAL EVERY 6 HOURS PRN
COMMUNITY
Start: 2025-05-19

## 2025-05-22 NOTE — PROGRESS NOTES
"Name: Zenia Dorado      : 1995      MRN: [id number]  Encounter Provider: Julio Eli PA-C  Encounter Date: 2025   Encounter department: [facility] UROLOGY PATI  :  Assessment & Plan  Hydronephrosis, unspecified hydronephrosis type    Orders:    POCT Measure PVR    POCT urine dip auto non-scope    Nephrolithiasis    Orders:    POCT Measure PVR    POCT urine dip auto non-scope    Voiding dysfunction    Orders:    tamsulosin (FLOMAX) 0.4 mg; Take 1 capsule (0.4 mg total) by mouth daily with dinner  I spoke with Dr. Horton and explained to the patient that we both do not believe her pain is urologic in nature although cannot be 100% sure.  She agreed to try tamsulosin over the weekend and let me know Tuesday if there is any improvement with better bladder emptying.  If not we will set her up for cystoscopy and left ureteroscopy.  Transvaginal ultrasound also unremarkable on the left side.      History of Present Illness   Zenia Dorado is a 29 y.o. female who presents history of kidney stones.  She began having lower left quadrant and flank pain in April.  A CAT scan showed no hydronephrosis or obstructing stones.  No UTI symptoms but she does feel like she has to push to get urine out.  She saw Dr. Horton in early May.  Follow-up ultrasound also was unremarkable.  Back to the emergency room May 16 and another CAT scan again did not show any obstructing stone.  She has persistent pain.  Urine is clear in the office today.  PVR 88 mL.    Review of Systems       Objective   /72 (BP Location: Left arm, Patient Position: Sitting, Cuff Size: Large)   Pulse 101   Ht 5' 2\" (1.575 m)   Wt 54.4 kg (120 lb)   SpO2 99%   BMI 21.95 kg/m²     Physical Exam GEN: no acute distress    RESP: breathing comfortably with no accessory muscle use    ABD: soft, non-tender, non-distended   INCISION:    EXT: no significant peripheral edema       RADIOLOGY:   IMPRESSION:     No " "acute inflammatory process identified.     Stable bilateral nonobstructing intrarenal calculi without hydronephrosis.           Results   No results found for: \"PSA\"  Lab Results   Component Value Date    GLUCOSE 91 06/12/2015    CALCIUM 9.1 05/16/2025     06/12/2015    K 4.6 05/16/2025    CO2 23 05/16/2025     05/16/2025    BUN 11 05/16/2025    CREATININE 0.77 05/16/2025     Lab Results   Component Value Date    WBC 6.95 05/16/2025    HGB 13.0 05/16/2025    HCT 39.0 05/16/2025    MCV 96 05/16/2025     05/16/2025       Office Urine Dip  No results found for this or any previous visit (from the past hour).      "

## (undated) DEVICE — OCCLUSIVE GAUZE STRIP,3% BISMUTH TRIBROMOPHENATE IN PETROLATUM BLEND: Brand: XEROFORM

## (undated) DEVICE — GAUZE SPONGES,16 PLY: Brand: CURITY

## (undated) DEVICE — ADHESIVE SKIN HIGH VISCOSITY EXOFIN 1ML

## (undated) DEVICE — SUT MONOCRYL 4-0 PS-2 18 IN Y496G

## (undated) DEVICE — ACE WRAP 3 IN UNSTERILE

## (undated) DEVICE — DRAPE KIT C-ARM W/PLATE PRTC FOOT SWITCH COVER

## (undated) DEVICE — GLOVE SRG BIOGEL ECLIPSE 7

## (undated) DEVICE — TUBING SUCTION 5MM X 12 FT

## (undated) DEVICE — Device

## (undated) DEVICE — SUT CHROMIC 5-0 P-3 18 IN 687G

## (undated) DEVICE — PADDING CAST 4 IN  COTTON STRL

## (undated) DEVICE — GLOVE INDICATOR PI UNDERGLOVE SZ 7 BLUE

## (undated) DEVICE — INTENDED FOR TISSUE SEPARATION, AND OTHER PROCEDURES THAT REQUIRE A SHARP SURGICAL BLADE TO PUNCTURE OR CUT.: Brand: BARD-PARKER ® CARBON RIB-BACK BLADES

## (undated) DEVICE — SPONGE SCRUB 4 PCT CHLORHEXIDINE

## (undated) DEVICE — SPLINT 3 X 15 IN XFAST SET PLASTER

## (undated) DEVICE — DISPOSABLE EQUIPMENT COVER: Brand: SMALL TOWEL DRAPE

## (undated) DEVICE — 3M™ STERI-STRIP™ BLEND TONE SKIN CLOSURES, B1557, TAN, 1/2 IN X 4 IN (12MM X 100MM), 6 STRIPS/ENVELOPE: Brand: 3M™ STERI-STRIP™

## (undated) DEVICE — SUT VICRYL 2-0 SH 27 IN UNDYED J417H

## (undated) DEVICE — GLOVE SRG BIOGEL 6.5

## (undated) DEVICE — SUT VICRYL 3-0 SH 27 IN J416H

## (undated) DEVICE — CUFF TOURNIQUET 18 X 4 IN QUICK CONNECT DISP 1 BLADDER

## (undated) DEVICE — STERILE BETHLEHEM PLASTIC HAND: Brand: CARDINAL HEALTH